# Patient Record
Sex: FEMALE | Race: NATIVE HAWAIIAN OR OTHER PACIFIC ISLANDER | NOT HISPANIC OR LATINO | Employment: UNEMPLOYED | URBAN - METROPOLITAN AREA
[De-identification: names, ages, dates, MRNs, and addresses within clinical notes are randomized per-mention and may not be internally consistent; named-entity substitution may affect disease eponyms.]

---

## 2017-05-21 ENCOUNTER — APPOINTMENT (OUTPATIENT)
Dept: RADIOLOGY | Facility: MEDICAL CENTER | Age: 22
End: 2017-05-21
Attending: OBSTETRICS & GYNECOLOGY
Payer: MEDICAID

## 2017-05-21 ENCOUNTER — HOSPITAL ENCOUNTER (INPATIENT)
Facility: MEDICAL CENTER | Age: 22
LOS: 2 days | End: 2017-05-23
Attending: OBSTETRICS & GYNECOLOGY | Admitting: OBSTETRICS & GYNECOLOGY
Payer: MEDICAID

## 2017-05-21 LAB
BASOPHILS # BLD AUTO: 0.3 % (ref 0–1.8)
BASOPHILS # BLD: 0.03 K/UL (ref 0–0.12)
EOSINOPHIL # BLD AUTO: 0.06 K/UL (ref 0–0.51)
EOSINOPHIL NFR BLD: 0.6 % (ref 0–6.9)
ERYTHROCYTE [DISTWIDTH] IN BLOOD BY AUTOMATED COUNT: 43.8 FL (ref 35.9–50)
HCT VFR BLD AUTO: 38.5 % (ref 37–47)
HGB BLD-MCNC: 12.3 G/DL (ref 12–16)
HOLDING TUBE BB 8507: NORMAL
IMM GRANULOCYTES # BLD AUTO: 0.07 K/UL (ref 0–0.11)
IMM GRANULOCYTES NFR BLD AUTO: 0.7 % (ref 0–0.9)
LYMPHOCYTES # BLD AUTO: 2.19 K/UL (ref 1–4.8)
LYMPHOCYTES NFR BLD: 21.9 % (ref 22–41)
MCH RBC QN AUTO: 25.8 PG (ref 27–33)
MCHC RBC AUTO-ENTMCNC: 31.9 G/DL (ref 33.6–35)
MCV RBC AUTO: 80.7 FL (ref 81.4–97.8)
MONOCYTES # BLD AUTO: 0.69 K/UL (ref 0–0.85)
MONOCYTES NFR BLD AUTO: 6.9 % (ref 0–13.4)
NEUTROPHILS # BLD AUTO: 6.94 K/UL (ref 2–7.15)
NEUTROPHILS NFR BLD: 69.6 % (ref 44–72)
NRBC # BLD AUTO: 0 K/UL
NRBC BLD AUTO-RTO: 0 /100 WBC
PLATELET # BLD AUTO: 210 K/UL (ref 164–446)
PMV BLD AUTO: 10.6 FL (ref 9–12.9)
RBC # BLD AUTO: 4.77 M/UL (ref 4.2–5.4)
WBC # BLD AUTO: 10 K/UL (ref 4.8–10.8)

## 2017-05-21 PROCEDURE — 85025 COMPLETE CBC W/AUTO DIFF WBC: CPT

## 2017-05-21 PROCEDURE — 770002 HCHG ROOM/CARE - OB PRIVATE (112)

## 2017-05-21 PROCEDURE — 700105 HCHG RX REV CODE 258: Performed by: OBSTETRICS & GYNECOLOGY

## 2017-05-21 PROCEDURE — 76815 OB US LIMITED FETUS(S): CPT

## 2017-05-21 PROCEDURE — 700111 HCHG RX REV CODE 636 W/ 250 OVERRIDE (IP): Performed by: OBSTETRICS & GYNECOLOGY

## 2017-05-21 PROCEDURE — 36415 COLL VENOUS BLD VENIPUNCTURE: CPT

## 2017-05-21 RX ORDER — MISOPROSTOL 200 UG/1
800 TABLET ORAL
Status: COMPLETED | OUTPATIENT
Start: 2017-05-21 | End: 2017-05-22

## 2017-05-21 RX ORDER — METHYLERGONOVINE MALEATE 0.2 MG/ML
0.2 INJECTION INTRAVENOUS
Status: DISCONTINUED | OUTPATIENT
Start: 2017-05-21 | End: 2017-05-22 | Stop reason: HOSPADM

## 2017-05-21 RX ORDER — PENICILLIN G POTASSIUM 5000000 [IU]/1
5 INJECTION, POWDER, FOR SOLUTION INTRAMUSCULAR; INTRAVENOUS ONCE
Status: COMPLETED | OUTPATIENT
Start: 2017-05-21 | End: 2017-05-21

## 2017-05-21 RX ORDER — SODIUM CHLORIDE, SODIUM LACTATE, POTASSIUM CHLORIDE, CALCIUM CHLORIDE 600; 310; 30; 20 MG/100ML; MG/100ML; MG/100ML; MG/100ML
INJECTION, SOLUTION INTRAVENOUS CONTINUOUS
Status: DISPENSED | OUTPATIENT
Start: 2017-05-21 | End: 2017-05-22

## 2017-05-21 RX ORDER — ALUMINA, MAGNESIA, AND SIMETHICONE 2400; 2400; 240 MG/30ML; MG/30ML; MG/30ML
30 SUSPENSION ORAL EVERY 6 HOURS PRN
Status: DISCONTINUED | OUTPATIENT
Start: 2017-05-21 | End: 2017-05-22 | Stop reason: HOSPADM

## 2017-05-21 RX ADMIN — PENICILLIN G POTASSIUM 5 MILLION UNITS: 5000000 POWDER, FOR SOLUTION INTRAMUSCULAR; INTRAPLEURAL; INTRATHECAL; INTRAVENOUS at 20:35

## 2017-05-21 RX ADMIN — SODIUM CHLORIDE, POTASSIUM CHLORIDE, SODIUM LACTATE AND CALCIUM CHLORIDE: 600; 310; 30; 20 INJECTION, SOLUTION INTRAVENOUS at 20:25

## 2017-05-21 RX ADMIN — Medication 2 MILLI-UNITS/MIN: at 20:38

## 2017-05-21 ASSESSMENT — PATIENT HEALTH QUESTIONNAIRE - PHQ9
SUM OF ALL RESPONSES TO PHQ9 QUESTIONS 1 AND 2: 0
SUM OF ALL RESPONSES TO PHQ QUESTIONS 1-9: 0
2. FEELING DOWN, DEPRESSED, IRRITABLE, OR HOPELESS: NOT AT ALL
1. LITTLE INTEREST OR PLEASURE IN DOING THINGS: NOT AT ALL

## 2017-05-21 ASSESSMENT — COPD QUESTIONNAIRES
DURING THE PAST 4 WEEKS HOW MUCH DID YOU FEEL SHORT OF BREATH: NONE/LITTLE OF THE TIME
DO YOU EVER COUGH UP ANY MUCUS OR PHLEGM?: NO/ONLY WITH OCCASIONAL COLDS OR INFECTIONS
HAVE YOU SMOKED AT LEAST 100 CIGARETTES IN YOUR ENTIRE LIFE: NO/DON'T KNOW
COPD SCREENING SCORE: 0

## 2017-05-21 ASSESSMENT — LIFESTYLE VARIABLES
EVER_SMOKED: NEVER
ALCOHOL_USE: NO

## 2017-05-21 NOTE — IP AVS SNAPSHOT
5/23/2017    Aleja Matute  [address] [place] [address]    Dear Aleja:    [facility] wants to ensure your discharge home is safe and you or your loved ones have had all of your questions answered regarding your care after you leave the hospital.    Below is a list of resources and contact information should you have any questions regarding your hospital stay, follow-up instructions, or active medical symptoms.    Questions or Concerns Regarding… Contact   Medical Questions Related to Your Discharge  (7 days a week, 8am-5pm) Contact a Nurse Care Coordinator   [phone]   Medical Questions Not Related to Your Discharge  (24 hours a day / 7 days a week)  Contact the Nurse Health Line   [phone]    Medications or Discharge Instructions Refer to your discharge packet   or contact your [facility] Primary Care Provider   [phone]   Follow-up Appointment(s) Schedule your appointment via [employer]   or contact Scheduling [phone]   Billing Review your statement via [employer]  or contact Billing [phone]   Medical Records Review your records via [employer]   or contact Medical Records [phone]     You may receive a telephone call within two days of discharge. This call is to make certain you understand your discharge instructions and have the opportunity to have any questions answered. You can also easily access your medical information, test results and upcoming appointments via the [employer] free online health management tool. You can learn more and sign up at [employer]. For assistance setting up your [employer] account, please call [phone].    Once again, we want to ensure your discharge home is safe and that you have a clear understanding of any next steps in your care. If you have any questions or concerns, please do not hesitate to contact us, we are here for you. Thank you for choosing [facility] for your healthcare needs.    Sincerely,    Your [facility] Healthcare Team

## 2017-05-21 NOTE — IP AVS SNAPSHOT
Home Care Instructions                                                                                                                Aleja Matute   MRN: [id number]    Department:  POST PARTUM 31   2017           Follow-up Information     1. Follow up with Natalie Han M.D. In 5 weeks.    Specialty:  OB/Gyn    Contact information    [address] [place] [address]  [place] [address]  [phone]         I assume responsibility for securing a follow-up  Screening blood test on my baby within the specified date range.    -                  Discharge Instructions       POSTPARTUM DISCHARGE INSTRUCTIONS FOR MOM    YOB: 1995   Age: 22 y.o.               Admit Date: 2017     Discharge Date: 2017  Attending Doctor:  Chau Sarabia M.D.                  Allergies:  Review of patient's allergies indicates no known allergies.    Discharged to home by car. Discharged via wheelchair, hospital escort: Yes.  Special equipment needed: Not Applicable  Belongings with: Personal  Be sure to schedule a follow-up appointment with your primary care doctor or any specialists as instructed.     Discharge Plan:   Influenza Vaccine Indication: Indicated: Not available from distributor/    REASONS TO CALL YOUR OBSTETRICIAN:  1.   Persistent fever or shaking chills (Temperature higher than 100.4)  2.   Heavy bleeding (soaking more than 1 pad per hour); Passing clots  3.   Foul odor from vagina  4.   Mastitis (Breast infection; breast pain, chills, fever, redness)  5.   Urinary pain, burning or frequency  6.   Episiotomy infection  7.   Abdominal incision infection  8.   Severe depression longer than 24 hours    HAND WASHING  · Prior to handling the baby.  · Before breastfeeding or bottle feeding baby.  · After using the bathroom or changing the baby's diaper.    WOUND CARE  Ask your physician for additional care instructions.  In general:    ·  Incision:      · Keep clean and dry.    · Do NOT lift  "anything heavier than your baby for up to 6 weeks.    · There should not be any opening or pus.      VAGINAL CARE  · Nothing inside vagina for 6 weeks: no sexual intercourse, tampons or douching.  · Bleeding may continue for 2-4 weeks.  Amount may vary.    · Call your physician for heavy bleeding which means soaking more than 1 pad per hour    BIRTH CONTROL  · It is possible to become pregnant at any time after delivery and while breastfeeding.  · Plan to discuss a method of birth control with your physician at your follow up visit. visit.    DIET AND ELIMINATION  · Eating more fiber (bran cereal, fruits, and vegetables) and drinking plenty of fluids will help to avoid constipation.  · Urinary frequency after childbirth is normal.    POSTPARTUM BLUES  During the first few days after birth, you may experience a sense of the \"blues\" which may include impatience, irritability or even crying.  These feeling come and go quickly.  However, as many as 1 in 10 women experience emotional symptoms known as postpartum depression.    Postpartum depression:  May start as early as the second or third day after delivery or take several weeks or months to develop.  Symptoms of \"blues\" are present, but are more intense:  Crying spells; loss of appetite; feelings of hopelessness or loss of control; fear of touching the baby; over concern or no concern at all about the baby; little or no concern about your own appearance/caring for yourself; and/or inability to sleep or excessive sleeping.  Contact your physician if you are experiencing any of these symptoms.    Crisis Hotline:  · [place] Crisis Hotline:  9-528-MWGPCRA  Or 1-332.757.2055  · [place] Crisis Hotline:  1-130.157.2337  Or 955-838-7991    PREVENTING SHAKEN BABY:  If you are angry or stressed, PUT THE BABY IN THE CRIB, step away, take some deep breaths, and wait until you are calm to care for the baby.  DO NOT SHAKE THE BABY.  You are not alone, call a supporter for " "help.    · Crisis Call Center 24/7 crisis line 481-394-7850 or 1-253.281.1385  · You can also text them, text \"ANSWER\" to 112051    QUIT SMOKING/TOBACCO USE:  I understand the use of any tobacco products increases my chance of suffering from future heart disease and could cause other illnesses which may shorten my life. Quitting the use of tobacco products is the single most important thing I can do to improve my health. For further information on smoking / tobacco cessation call a Toll Free Quit Line at 1-633.898.4911 (*National Cancer [employer]) or 1-867.288.5533 (American Lung Association) or you can access the web based program at www.lungusa.org.    · [place] Tobacco Users Help Line:  (917) 205-7722       Toll Free: 1-977.145.4312  · Quit Tobacco Program [facility] (225)207-9551    DEPRESSION / SUICIDE RISK:  As you are discharged from this [facility], it is important to learn how to keep safe from harming yourself.    Recognize the warning signs:  · Abrupt changes in personality, positive or negative- including increase in energy   · Giving away possessions  · Change in eating patterns- significant weight changes-  positive or negative  · Change in sleeping patterns- unable to sleep or sleeping all the time   · Unwillingness or inability to communicate  · Depression  · Unusual sadness, discouragement and loneliness  · Talk of wanting to die  · Neglect of personal appearance   · Rebelliousness- reckless behavior  · Withdrawal from people/activities they love  · Confusion- inability to concentrate     If you or a loved one observes any of these behaviors or has concerns about self-harm, here's what you can do:  · Talk about it- your feelings and reasons for harming yourself  · Remove any means that you might use to hurt yourself (examples: pills, rope, extension cords, firearm)  · Get professional help from the community (Mental Health, Substance Abuse, psychological " counseling)  · Do not be alone:Call your Safe Contact- someone whom you trust who will be there for you.  · Call your local CRISIS HOTLINE 828-9050 or 184-779-8487  · Call your local Children's Mobile Crisis Response Team [place] (450) 621-0649 or www.LedgerX  · Call the toll free National Suicide Prevention Hotlines   · National Suicide Prevention Lifeline 613-991-CJXG (6505)  · National Hope Line Network 800-SUICIDE (603-0317)    DISCHARGE SURVEY:  Thank you for choosing [facility].  We hope we provided you with very good care.  You may be receiving a survey in the mail.  Please fill it out.  Your opinion is valuable to us.    ADDITIONAL EDUCATIONAL MATERIALS GIVEN TO PATIENT:        My signature on this form indicates that:  1.  I have reviewed and understand the above information  2.  My questions regarding this information have been answered to my satisfaction.  3.  I have formulated a plan with my discharge nurse to obtain my prescribed medication for home.         Discharge Medication Instructions:    Below are the medications your physician expects you to take upon discharge:    Review all your home medications and newly ordered medications with your doctor and/or pharmacist. Follow medication instructions as directed by your doctor and/or pharmacist.    Please keep your medication list with you and share with your physician.               Medication List      START taking these medications        Instructions    Morning Afternoon Evening Bedtime     MG Caps        Take 100 mg by mouth 2 times a day as needed for Constipation.   Dose:  100 mg                        ibuprofen 600 MG Tabs   Last time this was given:  600 mg on 5/23/2017  6:14 AM   Commonly known as:  MOTRIN        Take 1 Tab by mouth every 6 hours as needed (For cramping after delivery; do not give if patient is receiving ketorolac (Toradol)).   Dose:  600 mg                          CONTINUE taking these medications         Instructions    Morning Afternoon Evening Bedtime    PNV PO        Take 1 Tab by mouth.   Dose:  1 Tab                             Where to Get Your Medications      Information about where to get these medications is not yet available     ! Ask your nurse or doctor about these medications    -  MG Caps  - ibuprofen 600 MG Tabs            Crisis Hotline:     [place] Crisis Hotline:  4-784-QZDZUJQ or 1-138.815.5967    [place] Crisis Hotline:    1-519.193.8496 or 773-016-9280        Disclaimer           _____________________________________                     __________       ________       Patient/Mother Signature or Legal                          Date                   Time

## 2017-05-21 NOTE — IP AVS SNAPSHOT
[employer] Access Code: [email or web address]  Expires: 6/22/2017  3:29 PM    [employer]  A secure, online tool to manage your health information     [employer]® is a secure, online tool that connects you to your personalized health information from the privacy of your home -- day or night - making it very easy for you to manage your healthcare. Once the activation process is completed, you can even access your medical information using the [employer] ney, which is available for free in the Apple Ney store or Google Play store.     [employer] provides the following levels of access (as shown below):   My Chart Features   [facility] Primary Care Doctor [facility]  Specialists [facility]  Urgent  Care Non-[facility]  Primary Care  Doctor   Email your healthcare team securely and privately 24/7 X X X X   Manage appointments: schedule your next appointment; view details of past/upcoming appointments X      Request prescription refills. X      View recent personal medical records, including lab and immunizations X X X X   View health record, including health history, allergies, medications X X X X   Read reports about your outpatient visits, procedures, consult and ER notes X X X X   See your discharge summary, which is a recap of your hospital and/or ER visit that includes your diagnosis, lab results, and care plan. X X       How to register for [employer]:  1. Go to  [email or web address].  2. Click on the Sign Up Now box, which takes you to the New Member Sign Up page. You will need to provide the following information:  a. Enter your [employer] Access Code exactly as it appears at the top of this page. (You will not need to use this code after you’ve completed the sign-up process. If you do not sign up before the expiration date, you must request a new code.)   b. Enter your date of birth.   c. Enter your home email address.   d. Click Submit, and follow the next screen’s instructions.  3. Create a [employer] ID. This will be your [employer]  login ID and cannot be changed, so think of one that is secure and easy to remember.  4. Create a [employer] password. You can change your password at any time.  5. Enter your Password Reset Question and Answer. This can be used at a later time if you forget your password.   6. Enter your e-mail address. This allows you to receive e-mail notifications when new information is available in [employer].  7. Click Sign Up. You can now view your health information.    For assistance activating your [employer] account, call [phone]

## 2017-05-22 LAB
ERYTHROCYTE [DISTWIDTH] IN BLOOD BY AUTOMATED COUNT: 43.6 FL (ref 35.9–50)
HCT VFR BLD AUTO: 38.4 % (ref 37–47)
HGB BLD-MCNC: 12.4 G/DL (ref 12–16)
MCH RBC QN AUTO: 26 PG (ref 27–33)
MCHC RBC AUTO-ENTMCNC: 32.3 G/DL (ref 33.6–35)
MCV RBC AUTO: 80.5 FL (ref 81.4–97.8)
PLATELET # BLD AUTO: 195 K/UL (ref 164–446)
PMV BLD AUTO: 10.6 FL (ref 9–12.9)
RBC # BLD AUTO: 4.77 M/UL (ref 4.2–5.4)
WBC # BLD AUTO: 12.7 K/UL (ref 4.8–10.8)

## 2017-05-22 PROCEDURE — 85027 COMPLETE CBC AUTOMATED: CPT

## 2017-05-22 PROCEDURE — 36415 COLL VENOUS BLD VENIPUNCTURE: CPT

## 2017-05-22 PROCEDURE — A9270 NON-COVERED ITEM OR SERVICE: HCPCS | Performed by: OBSTETRICS & GYNECOLOGY

## 2017-05-22 PROCEDURE — 59409 OBSTETRICAL CARE: CPT

## 2017-05-22 PROCEDURE — 770002 HCHG ROOM/CARE - OB PRIVATE (112)

## 2017-05-22 PROCEDURE — 700111 HCHG RX REV CODE 636 W/ 250 OVERRIDE (IP): Performed by: OBSTETRICS & GYNECOLOGY

## 2017-05-22 PROCEDURE — 700105 HCHG RX REV CODE 258: Performed by: OBSTETRICS & GYNECOLOGY

## 2017-05-22 PROCEDURE — 4A1H74Z MONITORING OF PRODUCTS OF CONCEPTION, CARDIAC ELECTRICAL ACTIVITY, VIA NATURAL OR ARTIFICIAL OPENING: ICD-10-PCS | Performed by: OBSTETRICS & GYNECOLOGY

## 2017-05-22 PROCEDURE — 700111 HCHG RX REV CODE 636 W/ 250 OVERRIDE (IP)

## 2017-05-22 PROCEDURE — 10907ZC DRAINAGE OF AMNIOTIC FLUID, THERAPEUTIC FROM PRODUCTS OF CONCEPTION, VIA NATURAL OR ARTIFICIAL OPENING: ICD-10-PCS | Performed by: OBSTETRICS & GYNECOLOGY

## 2017-05-22 PROCEDURE — 700102 HCHG RX REV CODE 250 W/ 637 OVERRIDE(OP): Performed by: OBSTETRICS & GYNECOLOGY

## 2017-05-22 PROCEDURE — 10H07YZ INSERTION OF OTHER DEVICE INTO PRODUCTS OF CONCEPTION, VIA NATURAL OR ARTIFICIAL OPENING: ICD-10-PCS | Performed by: OBSTETRICS & GYNECOLOGY

## 2017-05-22 PROCEDURE — 304965 HCHG RECOVERY SERVICES

## 2017-05-22 RX ORDER — METHYLERGONOVINE MALEATE 0.2 MG/ML
0.2 INJECTION INTRAVENOUS
Status: DISCONTINUED | OUTPATIENT
Start: 2017-05-22 | End: 2017-05-23 | Stop reason: HOSPADM

## 2017-05-22 RX ORDER — OXYCODONE HYDROCHLORIDE AND ACETAMINOPHEN 5; 325 MG/1; MG/1
1 TABLET ORAL EVERY 4 HOURS PRN
Status: DISCONTINUED | OUTPATIENT
Start: 2017-05-22 | End: 2017-05-23 | Stop reason: HOSPADM

## 2017-05-22 RX ORDER — SODIUM CHLORIDE, SODIUM LACTATE, POTASSIUM CHLORIDE, CALCIUM CHLORIDE 600; 310; 30; 20 MG/100ML; MG/100ML; MG/100ML; MG/100ML
INJECTION, SOLUTION INTRAVENOUS PRN
Status: DISCONTINUED | OUTPATIENT
Start: 2017-05-22 | End: 2017-05-23 | Stop reason: HOSPADM

## 2017-05-22 RX ORDER — MISOPROSTOL 200 UG/1
600 TABLET ORAL
Status: DISCONTINUED | OUTPATIENT
Start: 2017-05-22 | End: 2017-05-23 | Stop reason: HOSPADM

## 2017-05-22 RX ORDER — ONDANSETRON 2 MG/ML
4 INJECTION INTRAMUSCULAR; INTRAVENOUS EVERY 6 HOURS PRN
Status: DISCONTINUED | OUTPATIENT
Start: 2017-05-22 | End: 2017-05-23 | Stop reason: HOSPADM

## 2017-05-22 RX ORDER — DOCUSATE SODIUM 100 MG/1
100 CAPSULE, LIQUID FILLED ORAL 2 TIMES DAILY PRN
Status: DISCONTINUED | OUTPATIENT
Start: 2017-05-22 | End: 2017-05-23 | Stop reason: HOSPADM

## 2017-05-22 RX ORDER — OXYCODONE AND ACETAMINOPHEN 7.5; 325 MG/1; MG/1
1 TABLET ORAL EVERY 4 HOURS PRN
Status: CANCELLED | OUTPATIENT
Start: 2017-05-22

## 2017-05-22 RX ORDER — IBUPROFEN 600 MG/1
600 TABLET ORAL EVERY 6 HOURS PRN
Status: DISCONTINUED | OUTPATIENT
Start: 2017-05-22 | End: 2017-05-23 | Stop reason: HOSPADM

## 2017-05-22 RX ORDER — METHYLERGONOVINE MALEATE 0.2 MG/ML
INJECTION INTRAVENOUS
Status: COMPLETED
Start: 2017-05-22 | End: 2017-05-22

## 2017-05-22 RX ORDER — ACETAMINOPHEN 325 MG/1
650 TABLET ORAL EVERY 4 HOURS PRN
Status: CANCELLED | OUTPATIENT
Start: 2017-05-22

## 2017-05-22 RX ORDER — VITAMIN A ACETATE, BETA CAROTENE, ASCORBIC ACID, CHOLECALCIFEROL, .ALPHA.-TOCOPHEROL ACETATE, DL-, THIAMINE MONONITRATE, RIBOFLAVIN, NIACINAMIDE, PYRIDOXINE HYDROCHLORIDE, FOLIC ACID, CYANOCOBALAMIN, CALCIUM CARBONATE, FERROUS FUMARATE, ZINC OXIDE, CUPRIC OXIDE 3080; 12; 120; 400; 1; 1.84; 3; 20; 22; 920; 25; 200; 27; 10; 2 [IU]/1; UG/1; MG/1; [IU]/1; MG/1; MG/1; MG/1; MG/1; MG/1; [IU]/1; MG/1; MG/1; MG/1; MG/1; MG/1
1 TABLET, FILM COATED ORAL EVERY MORNING
Status: DISCONTINUED | OUTPATIENT
Start: 2017-05-22 | End: 2017-05-23 | Stop reason: HOSPADM

## 2017-05-22 RX ORDER — ONDANSETRON 4 MG/1
4 TABLET, ORALLY DISINTEGRATING ORAL EVERY 6 HOURS PRN
Status: DISCONTINUED | OUTPATIENT
Start: 2017-05-22 | End: 2017-05-23 | Stop reason: HOSPADM

## 2017-05-22 RX ORDER — OXYCODONE HYDROCHLORIDE AND ACETAMINOPHEN 5; 325 MG/1; MG/1
2 TABLET ORAL EVERY 4 HOURS PRN
Status: DISCONTINUED | OUTPATIENT
Start: 2017-05-22 | End: 2017-05-23 | Stop reason: HOSPADM

## 2017-05-22 RX ORDER — ACETAMINOPHEN 325 MG/1
325 TABLET ORAL EVERY 4 HOURS PRN
Status: DISCONTINUED | OUTPATIENT
Start: 2017-05-22 | End: 2017-05-23 | Stop reason: HOSPADM

## 2017-05-22 RX ORDER — IBUPROFEN 600 MG/1
600 TABLET ORAL EVERY 6 HOURS PRN
Status: CANCELLED | OUTPATIENT
Start: 2017-05-22

## 2017-05-22 RX ADMIN — OXYCODONE HYDROCHLORIDE AND ACETAMINOPHEN 1 TABLET: 5; 325 TABLET ORAL at 10:10

## 2017-05-22 RX ADMIN — METHYLERGONOVINE MALEATE 0.2 MG: 0.2 INJECTION, SOLUTION INTRAMUSCULAR; INTRAVENOUS at 03:15

## 2017-05-22 RX ADMIN — Medication 125 ML/HR: at 03:41

## 2017-05-22 RX ADMIN — OXYCODONE HYDROCHLORIDE AND ACETAMINOPHEN 1 TABLET: 5; 325 TABLET ORAL at 05:35

## 2017-05-22 RX ADMIN — IBUPROFEN 600 MG: 600 TABLET, FILM COATED ORAL at 05:35

## 2017-05-22 RX ADMIN — IBUPROFEN 600 MG: 600 TABLET, FILM COATED ORAL at 23:37

## 2017-05-22 RX ADMIN — IBUPROFEN 600 MG: 600 TABLET, FILM COATED ORAL at 11:37

## 2017-05-22 RX ADMIN — OXYCODONE HYDROCHLORIDE AND ACETAMINOPHEN 1 TABLET: 5; 325 TABLET ORAL at 23:37

## 2017-05-22 RX ADMIN — MISOPROSTOL 800 MCG: 200 TABLET ORAL at 03:23

## 2017-05-22 RX ADMIN — OXYCODONE HYDROCHLORIDE AND ACETAMINOPHEN 1 TABLET: 5; 325 TABLET ORAL at 19:15

## 2017-05-22 RX ADMIN — OXYCODONE HYDROCHLORIDE AND ACETAMINOPHEN 1 TABLET: 5; 325 TABLET ORAL at 15:10

## 2017-05-22 RX ADMIN — Medication 1 TABLET: at 09:43

## 2017-05-22 RX ADMIN — IBUPROFEN 600 MG: 600 TABLET, FILM COATED ORAL at 17:24

## 2017-05-22 RX ADMIN — SODIUM CHLORIDE 2.5 MILLION UNITS: 9 INJECTION, SOLUTION INTRAVENOUS at 00:33

## 2017-05-22 ASSESSMENT — PAIN SCALES - GENERAL
PAINLEVEL_OUTOF10: 3
PAINLEVEL_OUTOF10: 4
PAINLEVEL_OUTOF10: 3
PAINLEVEL_OUTOF10: 2
PAINLEVEL_OUTOF10: 4
PAINLEVEL_OUTOF10: 5
PAINLEVEL_OUTOF10: 5
PAINLEVEL_OUTOF10: 0
PAINLEVEL_OUTOF10: 6
PAINLEVEL_OUTOF10: 2

## 2017-05-22 NOTE — PROGRESS NOTES
- pt presents to unit with c/o of UC. Denies LOV, some old blood d/c with mucous, states +FM. Pt received prenatal care from [place], moved to [place] at 38 weeks to live with mom temporarily while SO relocated to [place] for a new job. Pt did not see there was a point to seek prenatal care until delivery. Did not look for a ped either. Pt states UC started at about 1300 and became more closer together at 1600. Pt does have a copy of prenatal records from provider.   - Pt is a , EDC 17, GA 41.0. GBBS +. Denies complications during pregnancy. Spoke with Dr. Sarabia regarding pt, order to perform SVE.   -Report to NOHEMI Urias RN.

## 2017-05-22 NOTE — CARE PLAN
Problem: Pain  Goal: Patient will have relaxed facial expressions and be able to rest between uterine contractions  Outcome: MET Date Met:  05/22/17  Pt desired natural childbirth with no medication. Labor support provided by family and RN. Pt tolerated well.

## 2017-05-22 NOTE — PROGRESS NOTES
1900 report received from triage RN, SARI 4/50/-2, Dr Sarabia at bedside with US. Orders to admit received  1930 report to THOMAS Cano RN  1938 Dr Sarabia called requesting an US for EFW

## 2017-05-22 NOTE — PROGRESS NOTES
0100- Received report from THOMAS Cano RN. Assumed care of pt. Pt laboring, tolerating well. Encouraged to call with needs.  0257- SVE complete. Dr. Sarabia called for delivery.  0306-  of a viable female by Dr. Sarabia. Apgars 8/9.  0500- Pt up to BR. Voided, nery care done. Assisted to w/c, transferred to PP. Report to ELLA Trammell RN

## 2017-05-22 NOTE — DELIVERY NOTE
DATE OF SERVICE:  2017    DELIVERY NOTE:  This patient is a 22-year-old [place] female,  3, para   2, currently at 41-0/7 weeks, walk-in patient, admitted for induction of labor   after evaluation.  The patient is 41-0/7 weeks with excellent dates, has an   estimated fetal weight on ultrasound of 9 pounds and is now post-dates.  The   patient was started on Pitocin augmentation of labor.  She was initially 4 cm   dilated, 50% effaced.  She dilated to 7 cm, complete effacement, and 0   station, had amniotomy performed with intrauterine pressure catheter placed.    After receiving 2 doses of penicillin G for group B strep positive   rectovaginal culture, clear amniotic fluid noted, dilated to complete and   complete with normal fetal heart rate tracing and delivered via normal   spontaneous vaginal delivery, complicated by nuchal cord x1.  Apgar scores 8   and 9.  Cord gases were drawn by myself.  Arterial pH 7.33 with a base excess   of 0, venous pH 7.40 with a base excess of 0.   mL.  Complications   none.  Category 1 fetal heart rate tracing throughout.       ____________________________________     MD IFEANYI AMADOR / AMANDA    DD:  2017 03:33:22  DT:  2017 03:57:59    D#:  [id number]  Job#:  [id number]

## 2017-05-22 NOTE — CARE PLAN
Problem: Pain  Goal: Alleviation of Pain or a reduction in pain to the patient’s comfort goal  Outcome: PROGRESSING AS EXPECTED  Discussed with pt options for pain management. Pt does not want an epidural. She has not had or needed one with her other babies. Discussed the benefits of epidural vs IV pain meds if there was a medical emergency, such as shoulder dystocia. She would be open to having some IV pain meds as she had some with prior deliveries. Pt knows to ask RN for meds when she is ready.     Problem: Risk for Infection, Impaired Wound Healing  Goal: Remain free from signs and symptoms of infection  Outcome: PROGRESSING AS EXPECTED  Discussed with patient the need to get two doses of antibiotics on board before AROM to reduce the chance of infection for baby.

## 2017-05-22 NOTE — PROGRESS NOTES
1930 Report rcvd from MECHELLE Thomson, at bedside. POC discussed, pt care assumed. Pt found to be lying in bed, in no apparent distress. Pt ambulated from triage to S220.   2025 IV placed. Lab here to draw labs.   2035 Beside US for EFW (~9#).   0100 Report to MECHELLE Preston, at bedside.

## 2017-05-22 NOTE — PROGRESS NOTES
Patient presents to unit from L&D via wheelchair with infant in arms and family member at side.  Infant placed to bassinet and patient transfers self to bed without issue.  IV fluids placed to pump per orders and bedside report received from off going RN.  Plan of care discussed, questions answered and understanding verbalized. Oriented patient to room, call light, care board, emergency light and delfina light.  Encouraged her to call for assistance or with questions, comments or concerns.  Patient requesting pain medications at this time.  CHRISTIE is in [place] with a new job.  Not .  Would like him on the birth certificate so will relay information to birth certificate clerks.

## 2017-05-22 NOTE — H&P
History and Physical;      Aleja Matute is a 22 y.o. year old female  at 41-0/7 weeks by 23 week ultrasound-walk-in patient-had previous OB care in [place] has had OB care from 23 weeks to 36 weeks. Presents for labor check-not currently in labor. Has medical records with her-ultrasound results confirm dates, prenatal labs normal, one hour Glucola 139, PIH labs normal, ultrasound shows posterior fundal placenta. The patient has a history of macrosomia with previous 9 pound baby. No history of shoulder dystocia. GBS culture positive at 36 weeks.    Subjective:   negative  For CTXS.   negative Feels pain   negative for LOF  negative for vaginal bleeding.   positive for fetal movement    ROS: A comprehensive review of systems was negative.    No past medical history on file.  No past surgical history on file.  OB History    Para Term  AB SAB TAB Ectopic Multiple Living   2 1 1       1      # Outcome Date GA Lbr [place]/2nd Weight Sex Delivery Anes PTL Lv   2             1 Term                 Social History     Social History   • Marital Status: Single     Spouse Name: N/A   • Number of Children: N/A   • Years of Education: N/A     Occupational History   • Not on file.     Social History Main Topics   • Smoking status: Never Smoker    • Smokeless tobacco: Never Used   • Alcohol Use: Yes      Comment: first time today   • Drug Use: No   • Sexual Activity: Not on file     Other Topics Concern   • Not on file     Social History Narrative     Allergies: Review of patient's allergies indicates no known allergies.    Current facility-administered medications:   •  LR infusion, , Intravenous, Continuous, Chau Sarabia M.D.  •  mag hydrox-al hydrox-simeth (MAALOX PLUS ES or MYLANTA DS) suspension 30 mL, 30 mL, Oral, Q6HRS PRN, Chau Sarabia M.D.  •  penicillin G potassium injection 5 Million Units, 5 Million Units, Intravenous, Once **FOLLOWED BY** penicillin G potassium 2.5 Million Units in   "mL IVPB, 2.5 Million Units, Intravenous, Q4HRS, Chau Sarabia M.D.  •  oxytocin (PITOCIN) infusion (for induction), 0.5-20 bobo-units/min, Intravenous, Continuous, Chau Sarabia M.D.  •  misoprostol (CYTOTEC) tablet 800 mcg, 800 mcg, Rectal, Once PRN, Chau Sarabia M.D.  •  methylergonovine (METHERGINE) injection 0.2 mg, 0.2 mg, Intramuscular, Once PRN, Chau Sarabia M.D.    Prenatal care with [facility] with the following problems:  Patient Active Problem List    Diagnosis Date Noted   • Labor and delivery, indication for care 05/21/2017   • Labor and delivery indication for care or intervention 01/09/2016         Objective:      Blood pressure 118/69, pulse 86, temperature 36.5 °C (97.7 °F), temperature source Temporal, resp. rate 18, height 1.6 m (5' 3\"), weight 114.306 kg (252 lb).    General:   no acute distress   Skin:   normal   HEENT:  PERRLA   Lungs:   CTA bilateral   Heart:   brisk carotid upstroke without bruits, peripheral pulses very brisk, chest is clear without rales or wheezing, no pedal edema, no JVD, no hepatosplenomegaly   Abdomen:   gravid, NT   EFW:  4200 g   Pelvis:  Vulva and vagina appear normal. Bimanual exam reveals normal uterus and adnexa., proven to 4100 g   FHTs: 145 BPM good accels no decels good variability   Contractions: 0 contractions in 10 min soft to palpation   Uterine Size: S=D   Presentations: Cephalic per RN   Cervix: Per RN    Dilation: 4 cm    Effacement: 50%    Station:  -1    Consistency: Soft    Position: Middle     Complete OB US  See labs    Lab Review  Lab:   Blood type: O     No results found for this or any previous visit (from the past 5880 hour(s)).     Assessment:   1.  IUP at Unknown  2.  Labor status: Not in labor.  3.  Cat 1 FHTs  4.  Obstetrical history significant for   Patient Active Problem List    Diagnosis Date Noted   • Labor and delivery, indication for care 05/21/2017   • Labor and delivery indication for care or intervention 01/09/2016   .    "   Plan:     Admit to L&D  GBS positive  Routine labs  Pain management prn  Penicillin G for GBS positive rectovaginal culture  We'll obtain an estimated fetal weight  Patient has a clinical estimated fetal weight approximately 4200 g, estimated fetal weight by ultrasound is pending  Patient will be offered primary  section if estimated fetal weight 4500 g or greater. In addition, this patient is at great risk for shoulder dystocia even with estimated fetal weight less than 4500 g-I will discuss the possibility of performing elective primary  section and counseled her regarding the risks of shoulder dystocia and Erb's palsy.  Patient is also at great risk for postpartum hemorrhage.

## 2017-05-22 NOTE — CARE PLAN
Problem: Altered physiologic condition related to immediate post-delivery state and potential for bleeding/hemorrhage  Goal: Patient physiologically stable as evidenced by normal lochia, palpable uterine involution and vital signs within normal limits  Outcome: PROGRESSING AS EXPECTED  Assess fundus and lochia every 4 hours x12 hours followed by every shift and as indicated.  Educate on appropriate amounts of bleeding and when to contact physician

## 2017-05-22 NOTE — CARE PLAN
Problem: Risk for injury  Goal: Patient and fetus will be free of preventable injury/complications  Outcome: MET Date Met:  17  0306-  of a viable female by Dr. Sarabia. Apgars 8/9.

## 2017-05-22 NOTE — CARE PLAN
Problem: Alteration in comfort related to episiotomy, vaginal repair and/or after birth pains  Goal: Patient is able to ambulate, care for self and infant  Outcome: PROGRESSING AS EXPECTED  Assess pain level every 2-4 hours per protocol.  Patient will ask for pain medication as she sees fit

## 2017-05-22 NOTE — PROGRESS NOTES
Cervix- 7 cm/100 %effaced/0 station    AROM- clear fluid,IUPC placed    PCN g x 2 doses    Continue pitocin    EFW 4085 gms on US

## 2017-05-23 VITALS
RESPIRATION RATE: 18 BRPM | BODY MASS INDEX: 44.65 KG/M2 | WEIGHT: 252 LBS | TEMPERATURE: 97.4 F | SYSTOLIC BLOOD PRESSURE: 96 MMHG | HEART RATE: 70 BPM | DIASTOLIC BLOOD PRESSURE: 48 MMHG | HEIGHT: 63 IN | OXYGEN SATURATION: 96 %

## 2017-05-23 PROCEDURE — A9270 NON-COVERED ITEM OR SERVICE: HCPCS | Performed by: OBSTETRICS & GYNECOLOGY

## 2017-05-23 PROCEDURE — 700102 HCHG RX REV CODE 250 W/ 637 OVERRIDE(OP): Performed by: OBSTETRICS & GYNECOLOGY

## 2017-05-23 RX ORDER — IBUPROFEN 600 MG/1
600 TABLET ORAL EVERY 6 HOURS PRN
Qty: 60 TAB | Refills: 0 | Status: ON HOLD | OUTPATIENT
Start: 2017-05-23 | End: 2020-09-12

## 2017-05-23 RX ORDER — PSEUDOEPHEDRINE HCL 30 MG
100 TABLET ORAL 2 TIMES DAILY PRN
Qty: 60 CAP | Refills: 0 | Status: ON HOLD | OUTPATIENT
Start: 2017-05-23 | End: 2020-09-12

## 2017-05-23 RX ADMIN — OXYCODONE HYDROCHLORIDE AND ACETAMINOPHEN 1 TABLET: 5; 325 TABLET ORAL at 14:04

## 2017-05-23 RX ADMIN — OXYCODONE HYDROCHLORIDE AND ACETAMINOPHEN 1 TABLET: 5; 325 TABLET ORAL at 06:14

## 2017-05-23 RX ADMIN — Medication 1 TABLET: at 08:26

## 2017-05-23 RX ADMIN — IBUPROFEN 600 MG: 600 TABLET, FILM COATED ORAL at 06:14

## 2017-05-23 ASSESSMENT — PAIN SCALES - GENERAL
PAINLEVEL_OUTOF10: 2
PAINLEVEL_OUTOF10: 0
PAINLEVEL_OUTOF10: 2
PAINLEVEL_OUTOF10: 5
PAINLEVEL_OUTOF10: 7

## 2017-05-23 NOTE — DISCHARGE SUMMARY
Discharge Summary:      Aleja Matute      Admit Date:   2017  Discharge Date:  2017     Admitting diagnosis:  Pregnancy  Labor and delivery, indication for care  Discharge Diagnosis: Status post vaginal, spontaneous.  Pregnancy Complications: none  Tubal Ligation:  no        History:  History reviewed. No pertinent past medical history.  OB History    Para Term  AB SAB TAB Ectopic Multiple Living   4 2 1 0 1 1    2      # Outcome Date GA Lbr [place]/2nd Weight Sex Delivery Anes PTL Lv   4             3 SAB            2 Para            1 Term                    Review of patient's allergies indicates no known allergies.  Patient Active Problem List    Diagnosis Date Noted   • Labor and delivery, indication for care 2017   • Labor and delivery indication for care or intervention 2016        Hospital Course:   22 y.o. , now para 3, was admitted with the above mentioned diagnosis, underwent Active Labor, vaginal, spontaneous. Patient postpartum course was unremarkable, with progressive advancement in diet , ambulation and toleration of oral analgesia. Patient without complaints today and desires discharge.      Filed Vitals:    17 0531 17 0800 17 1200 17   BP: 118/75 116/85 117/74 109/61   Pulse: 94 60 67 66   Temp: 36.8 °C (98.2 °F) 36.7 °C (98 °F) 36.5 °C (97.7 °F) 36.4 °C (97.5 °F)   TempSrc:       Resp: 19 16 20 18   Height:       Weight:       SpO2: 95% 96% 94% 98%       Current Facility-Administered Medications   Medication Dose   • ondansetron (ZOFRAN ODT) dispertab 4 mg  4 mg    Or   • ondansetron (ZOFRAN) syringe/vial injection 4 mg  4 mg   • oxytocin (PITOCIN) infusion (for postpartum)   mL/hr   • ibuprofen (MOTRIN) tablet 600 mg  600 mg   • acetaminophen (TYLENOL) tablet 325 mg  325 mg   • oxycodone-acetaminophen (PERCOCET) 5-325 MG per tablet 2 Tab  2 Tab   • oxycodone-acetaminophen (PERCOCET) 5-325 MG per tablet 1 Tab  1  Tab   • LR infusion     • PRN oxytocin (PITOCIN) (20 Units/1000 mL) PRN for excessive uterine bleeding - See Admin Instr  125-999 mL/hr   • misoprostol (CYTOTEC) tablet 600 mcg  600 mcg   • methylergonovine (METHERGINE) injection 0.2 mg  0.2 mg   • docusate sodium (COLACE) capsule 100 mg  100 mg   • prenatal plus vitamin (STUARTNATAL 1+1) 27-1 MG tablet 1 Tab  1 Tab       Exam:  Breast Exam: negative  Abdomen: Abdomen soft, non-tender. BS normal. No masses,  No organomegaly  Fundus Non Tender: yes  Incision: none  Perineum: perineum intact  Extremity: extremities, peripheral pulses and reflexes normal, 1+ pedal edema bilaterally, No redness or tenderness in the calves or thighs     Labs:  Recent Labs      05/21/17 2025 05/22/17   1155   WBC  10.0  12.7*   RBC  4.77  4.77   HEMOGLOBIN  12.3  12.4   HEMATOCRIT  38.5  38.4   MCV  80.7*  80.5*   MCH  25.8*  26.0*   MCHC  31.9*  32.3*   RDW  43.8  43.6   PLATELETCT  210  195   MPV  10.6  10.6        Activity:   Discharge to home  Pelvic Rest x 6 weeks    Assessment:  normal postpartum course  Discharge Assessment: No heavy bleeding or foul vaginal discharge      [email or web address] or [facility] in 5 weeks for vaginal ; 1 week for incision check.      Discharge Meds:   Current Outpatient Prescriptions   Medication Sig Dispense Refill   • ibuprofen (MOTRIN) 600 MG Tab Take 1 Tab by mouth every 6 hours as needed (For cramping after delivery; do not give if patient is receiving ketorolac (Toradol)). 60 Tab 0   • docusate sodium 100 MG Cap Take 100 mg by mouth 2 times a day as needed for Constipation. 60 Cap 0       Eden De La Rosa M.D.

## 2017-05-23 NOTE — CARE PLAN
Problem: Alteration in comfort related to episiotomy, vaginal repair and/or after birth pains  Goal: Patient verbalizes acceptable pain level  Outcome: PROGRESSING AS EXPECTED  Reviewed 0-10 pain scale and available pain meds with pt.    Problem: Potential knowledge deficit related to lack of understanding of self and  care  Goal: Patient will demonstrate ability to care for self and infant  Outcome: PROGRESSING AS EXPECTED  Patient demonstrated knowledge in care for herself and infant with feeding, changing diaper, and comforting.

## 2017-05-23 NOTE — PROGRESS NOTES
Received report from Kimberly MIN.  Assumed patient care. Assessment complete. Pt would like to receive her pain meds on schedule. Pt rated her pain at a 4; percocet was given. Pt nipples are inverted. Pt states that baby is breastfeeding fine and that she brought nipple shields if needed. Will continue postpartum care.

## 2017-05-23 NOTE — PROGRESS NOTES
Report received from night RN. Patient resting comfortably in bed. No complaints of pain at this time. Discussed pain control plan with patient. Patient would like pain medication on a PRN basis, she would like to call for pain medication. All questions answered.

## 2017-05-23 NOTE — DISCHARGE INSTRUCTIONS
POSTPARTUM DISCHARGE INSTRUCTIONS FOR MOM    YOB: 1995   Age: 22 y.o.               Admit Date: 2017     Discharge Date: 2017  Attending Doctor:  Chau Sarabia M.D.                  Allergies:  Review of patient's allergies indicates no known allergies.    Discharged to home by car. Discharged via wheelchair, hospital escort: Yes.  Special equipment needed: Not Applicable  Belongings with: Personal  Be sure to schedule a follow-up appointment with your primary care doctor or any specialists as instructed.     Discharge Plan:   Influenza Vaccine Indication: Indicated: Not available from distributor/    REASONS TO CALL YOUR OBSTETRICIAN:  1.   Persistent fever or shaking chills (Temperature higher than 100.4)  2.   Heavy bleeding (soaking more than 1 pad per hour); Passing clots  3.   Foul odor from vagina  4.   Mastitis (Breast infection; breast pain, chills, fever, redness)  5.   Urinary pain, burning or frequency  6.   Episiotomy infection  7.   Abdominal incision infection  8.   Severe depression longer than 24 hours    HAND WASHING  · Prior to handling the baby.  · Before breastfeeding or bottle feeding baby.  · After using the bathroom or changing the baby's diaper.    WOUND CARE  Ask your physician for additional care instructions.  In general:    ·  Incision:      · Keep clean and dry.    · Do NOT lift anything heavier than your baby for up to 6 weeks.    · There should not be any opening or pus.      VAGINAL CARE  · Nothing inside vagina for 6 weeks: no sexual intercourse, tampons or douching.  · Bleeding may continue for 2-4 weeks.  Amount may vary.    · Call your physician for heavy bleeding which means soaking more than 1 pad per hour    BIRTH CONTROL  · It is possible to become pregnant at any time after delivery and while breastfeeding.  · Plan to discuss a method of birth control with your physician at your follow up visit. visit.    DIET AND  "ELIMINATION  · Eating more fiber (bran cereal, fruits, and vegetables) and drinking plenty of fluids will help to avoid constipation.  · Urinary frequency after childbirth is normal.    POSTPARTUM BLUES  During the first few days after birth, you may experience a sense of the \"blues\" which may include impatience, irritability or even crying.  These feeling come and go quickly.  However, as many as 1 in 10 women experience emotional symptoms known as postpartum depression.    Postpartum depression:  May start as early as the second or third day after delivery or take several weeks or months to develop.  Symptoms of \"blues\" are present, but are more intense:  Crying spells; loss of appetite; feelings of hopelessness or loss of control; fear of touching the baby; over concern or no concern at all about the baby; little or no concern about your own appearance/caring for yourself; and/or inability to sleep or excessive sleeping.  Contact your physician if you are experiencing any of these symptoms.    Crisis Hotline:  · [place] Crisis Hotline:  1-738-QBIWJYA  Or 1-858.813.4733  · [place] Crisis Hotline:  1-155.865.9837  Or 245-103-1584    PREVENTING SHAKEN BABY:  If you are angry or stressed, PUT THE BABY IN THE CRIB, step away, take some deep breaths, and wait until you are calm to care for the baby.  DO NOT SHAKE THE BABY.  You are not alone, call a supporter for help.    · Crisis Call Center 24/7 crisis line 798-627-8946 or 1-609.268.8085  · You can also text them, text \"ANSWER\" to 701822    QUIT SMOKING/TOBACCO USE:  I understand the use of any tobacco products increases my chance of suffering from future heart disease and could cause other illnesses which may shorten my life. Quitting the use of tobacco products is the single most important thing I can do to improve my health. For further information on smoking / tobacco cessation call a Toll Free Quit Line at 1-178.878.7985 (*[employer] [place]) or " 1-303.876.1082 (American Lung Association) or you can access the web based program at www.lungusa.org.    · [place] Tobacco Users Help Line:  (412) 853-9757       Toll Free: 1-883.566.2679  · Quit Tobacco Program [facility] (551)156-8251    DEPRESSION / SUICIDE RISK:  As you are discharged from this [facility], it is important to learn how to keep safe from harming yourself.    Recognize the warning signs:  · Abrupt changes in personality, positive or negative- including increase in energy   · Giving away possessions  · Change in eating patterns- significant weight changes-  positive or negative  · Change in sleeping patterns- unable to sleep or sleeping all the time   · Unwillingness or inability to communicate  · Depression  · Unusual sadness, discouragement and loneliness  · Talk of wanting to die  · Neglect of personal appearance   · Rebelliousness- reckless behavior  · Withdrawal from people/activities they love  · Confusion- inability to concentrate     If you or a loved one observes any of these behaviors or has concerns about self-harm, here's what you can do:  · Talk about it- your feelings and reasons for harming yourself  · Remove any means that you might use to hurt yourself (examples: pills, rope, extension cords, firearm)  · Get professional help from the community (Mental Health, Substance Abuse, psychological counseling)  · Do not be alone:Call your Safe Contact- someone whom you trust who will be there for you.  · Call your local CRISIS HOTLINE 164-6824 or 077-085-8926  · Call your local Children's Mobile Crisis Response Team [place] (539) 642-9231 or www.[facility]  · Call the toll free National Suicide Prevention Hotlines   · National Suicide Prevention Lifeline 037-146-CYYI (9444)  · National Hope Line Network 800-SUICIDE (140-6341)    DISCHARGE SURVEY:  Thank you for choosing [facility].  We hope we provided you with very good care.  You may be  receiving a survey in the mail.  Please fill it out.  Your opinion is valuable to us.    ADDITIONAL EDUCATIONAL MATERIALS GIVEN TO PATIENT:        My signature on this form indicates that:  1.  I have reviewed and understand the above information  2.  My questions regarding this information have been answered to my satisfaction.  3.  I have formulated a plan with my discharge nurse to obtain my prescribed medication for home.

## 2017-05-24 NOTE — PROGRESS NOTES
Patient discharged to home with family. All discharge instructions given to and explained to patient. Patient verbalizes understanding. Signed copy of discharge instructions in patients chart. All personal belongings sent home with patient. Patient escorted out by hospital staff via wheel chair.

## 2017-07-20 NOTE — ADDENDUM NOTE
Encounter addended by: Amber Snow R.N. on: 7/20/2017  3:49 PM<BR>     Documentation filed: Inpatient Document Flowsheet

## 2020-04-28 ENCOUNTER — GYNECOLOGY VISIT (OUTPATIENT)
Dept: OBGYN | Facility: CLINIC | Age: 25
End: 2020-04-28
Payer: COMMERCIAL

## 2020-04-28 VITALS — BODY MASS INDEX: 42.34 KG/M2 | SYSTOLIC BLOOD PRESSURE: 120 MMHG | WEIGHT: 239 LBS | DIASTOLIC BLOOD PRESSURE: 74 MMHG

## 2020-04-28 DIAGNOSIS — N93.8 DUB (DYSFUNCTIONAL UTERINE BLEEDING): ICD-10-CM

## 2020-04-28 LAB
INT CON NEG: NEGATIVE
INT CON POS: POSITIVE
POC URINE PREGNANCY TEST: POSITIVE

## 2020-04-28 PROCEDURE — 99214 OFFICE O/P EST MOD 30 MIN: CPT | Mod: 25 | Performed by: OBSTETRICS & GYNECOLOGY

## 2020-04-28 PROCEDURE — 76830 TRANSVAGINAL US NON-OB: CPT | Performed by: OBSTETRICS & GYNECOLOGY

## 2020-04-28 NOTE — PROGRESS NOTES
Chief complaint;  This patient is a 24 y.o. female , Patient's last menstrual period was 2019 (exact date). presents complaining of dysfunctional uterine bleeding.    Review of systems-denies; chest pain, shortness of breath, fever, chills, abdominal pain  Past OB history-  OB History    Para Term  AB Living   5 3 3 0 1 3   SAB TAB Ectopic Molar Multiple Live Births   1         3      # Outcome Date GA Lbr [place]/2nd Weight Sex Delivery Anes PTL Lv   5 Current            4 Term  40w0d  4.132 kg (9 lb 1.8 oz) F Vag-Spont      3 Term  40w0d  4.082 kg (9 lb) M Vag-Spont   TIMOTHY   2 Term  40w0d  3.799 kg (8 lb 6 oz) M Vag-Spont   TIMOTHY   1 SAB              Past surgical history- History reviewed. No pertinent surgical history.  Past medical history- History reviewed. No pertinent past medical history.  Allergies- Patient has no known allergies.  Present medications-   Outpatient Encounter Medications as of 2020   Medication Sig Dispense Refill   • ibuprofen (MOTRIN) 600 MG Tab Take 1 Tab by mouth every 6 hours as needed (For cramping after delivery; do not give if patient is receiving ketorolac (Toradol)). 60 Tab 0   • docusate sodium 100 MG Cap Take 100 mg by mouth 2 times a day as needed for Constipation. 60 Cap 0   • Prenatal Vit w/Ut-Bwttrhtxq-VP (PNV PO) Take 1 Tab by mouth.       No facility-administered encounter medications on file as of 2020.      Family history- no history of breast or ovarian cancer  Social history-   Social History     Socioeconomic History   • Marital status: Single     Spouse name: Not on file   • Number of children: Not on file   • Years of education: Not on file   • Highest education level: Not on file   Occupational History   • Not on file   Social Needs   • Financial resource strain: Not on file   • Food insecurity     Worry: Not on file     Inability: Not on file   • Transportation needs     Medical: Not on file     Non-medical: Not on file    Tobacco Use   • Smoking status: Never Smoker   • Smokeless tobacco: Never Used   Substance and Sexual Activity   • Alcohol use: Yes     Comment: first time today   • Drug use: No   • Sexual activity: Yes     Partners: Male   Lifestyle   • Physical activity     Days per week: Not on file     Minutes per session: Not on file   • Stress: Not on file   Relationships   • Social connections     Talks on phone: Not on file     Gets together: Not on file     Attends [employer] service: Not on file     Active member of club or organization: Not on file     Attends meetings of clubs or organizations: Not on file     Relationship status: Not on file   • Intimate partner violence     Fear of current or ex partner: Not on file     Emotionally abused: Not on file     Physically abused: Not on file     Forced sexual activity: Not on file   Other Topics Concern   • Not on file   Social History Narrative   • Not on file         Physical examination;   Alert and oriented x3  General-well-developed well-nourished female in no apparent distress  Vitals:    04/28/20 0850   BP: 120/74   Weight: 108.4 kg (239 lb)     Skin is warm and dry   HEENT-normocephalic,nontraumatic,PERRLA,EOMI  Throat- no edema or erythema  Neck-supple  Cardiovascular-regular rate and rhythm, normal S1-S2 without murmurs or gallops  Lungs-clear to auscultation bilaterally  Back-negative CVA tenderness  Abdomen-nondistended positive bowel sounds soft nontender without masses or hepatosplenomegaly  Pelvic examination;  External genitalia-without lesions  Vagina-no blood, no discharge  Cervix-closed,no gross lesions  Uterus-  20 weeks size, nontender  Adnexa- without mass or tenderness  Extremities-without cyanosis clubbing or edema  Neurologic-grossly intact    Transvaginal ultrasound; as performed and read by myself; hardy intrauterine pregnancy composite measurements consistent with 20-5/7 weeks EDC 9/10/2020+ cardiac motion no free pelvic fluid, no adnexal  masses anterior placenta    Impression;  Dysfunctional uterine bleeding-no evidence of ectopic or missed  Late care    Plan;   SS- too late  Needs initial OB      35 Minutes total spent with the patient in face-to-face contact,5 minutes of total time utilized to perform  Ultrasound, greater than 50% of the time has been spent on counseling and coordination of care.  Early DU B counseling performed-all questions answered in detail

## 2020-04-28 NOTE — NON-PROVIDER
Pt here DUB  LMP: 11/30/20 GA: 21w3d by LMP  + FM   States no issues at this moment   In clinic Pregnancy test: Positive  Good Ph# [phone]  Pharmacy confirmed w/ pt

## 2020-05-06 ENCOUNTER — HOSPITAL ENCOUNTER (OUTPATIENT)
Dept: LAB | Facility: MEDICAL CENTER | Age: 25
End: 2020-05-06
Attending: OBSTETRICS & GYNECOLOGY
Payer: COMMERCIAL

## 2020-05-06 ENCOUNTER — INITIAL PRENATAL (OUTPATIENT)
Dept: OBGYN | Facility: CLINIC | Age: 25
End: 2020-05-06
Payer: COMMERCIAL

## 2020-05-06 ENCOUNTER — HOSPITAL ENCOUNTER (OUTPATIENT)
Facility: MEDICAL CENTER | Age: 25
End: 2020-05-06
Attending: OBSTETRICS & GYNECOLOGY
Payer: COMMERCIAL

## 2020-05-06 VITALS
SYSTOLIC BLOOD PRESSURE: 136 MMHG | HEIGHT: 63 IN | DIASTOLIC BLOOD PRESSURE: 79 MMHG | BODY MASS INDEX: 42.84 KG/M2 | WEIGHT: 241.8 LBS

## 2020-05-06 DIAGNOSIS — O09.92 HIGH-RISK PREGNANCY IN SECOND TRIMESTER: ICD-10-CM

## 2020-05-06 DIAGNOSIS — O09.30 LATE PRENATAL CARE: ICD-10-CM

## 2020-05-06 LAB
ABO GROUP BLD: NORMAL
APPEARANCE UR: CLEAR
APPEARANCE UR: NORMAL
BACTERIA #/AREA URNS HPF: ABNORMAL /HPF
BASOPHILS # BLD AUTO: 0.3 % (ref 0–1.8)
BASOPHILS # BLD: 0.03 K/UL (ref 0–0.12)
BILIRUB UR QL STRIP.AUTO: NEGATIVE
BILIRUB UR STRIP-MCNC: NORMAL MG/DL
BLD GP AB SCN SERPL QL: NORMAL
COLOR UR AUTO: NORMAL
COLOR UR: YELLOW
EOSINOPHIL # BLD AUTO: 0.07 K/UL (ref 0–0.51)
EOSINOPHIL NFR BLD: 0.7 % (ref 0–6.9)
ERYTHROCYTE [DISTWIDTH] IN BLOOD BY AUTOMATED COUNT: 43 FL (ref 35.9–50)
GLUCOSE UR STRIP.AUTO-MCNC: NEGATIVE MG/DL
GLUCOSE UR STRIP.AUTO-MCNC: NEGATIVE MG/DL
HBV SURFACE AG SER QL: ABNORMAL
HCT VFR BLD AUTO: 40.3 % (ref 37–47)
HGB BLD-MCNC: 12.7 G/DL (ref 12–16)
HIV 1+2 AB+HIV1 P24 AG SERPL QL IA: NORMAL
IMM GRANULOCYTES # BLD AUTO: 0.12 K/UL (ref 0–0.11)
IMM GRANULOCYTES NFR BLD AUTO: 1.1 % (ref 0–0.9)
KETONES UR STRIP.AUTO-MCNC: NEGATIVE MG/DL
KETONES UR STRIP.AUTO-MCNC: NEGATIVE MG/DL
LEUKOCYTE ESTERASE UR QL STRIP.AUTO: NEGATIVE
LEUKOCYTE ESTERASE UR QL STRIP.AUTO: NORMAL
LYMPHOCYTES # BLD AUTO: 1.99 K/UL (ref 1–4.8)
LYMPHOCYTES NFR BLD: 18.8 % (ref 22–41)
MCH RBC QN AUTO: 28 PG (ref 27–33)
MCHC RBC AUTO-ENTMCNC: 31.5 G/DL (ref 33.6–35)
MCV RBC AUTO: 88.8 FL (ref 81.4–97.8)
MICRO URNS: ABNORMAL
MONOCYTES # BLD AUTO: 0.62 K/UL (ref 0–0.85)
MONOCYTES NFR BLD AUTO: 5.8 % (ref 0–13.4)
MUCOUS THREADS #/AREA URNS HPF: ABNORMAL /HPF
NEUTROPHILS # BLD AUTO: 7.78 K/UL (ref 2–7.15)
NEUTROPHILS NFR BLD: 73.3 % (ref 44–72)
NITRITE UR QL STRIP.AUTO: NEGATIVE
NITRITE UR QL STRIP.AUTO: NEGATIVE
NRBC # BLD AUTO: 0 K/UL
NRBC BLD-RTO: 0 /100 WBC
PH UR STRIP.AUTO: 6 [PH] (ref 5–8)
PH UR STRIP.AUTO: 6.5 [PH] (ref 5–8)
PLATELET # BLD AUTO: 193 K/UL (ref 164–446)
PMV BLD AUTO: 11.5 FL (ref 9–12.9)
PROT UR QL STRIP: NEGATIVE MG/DL
PROT UR QL STRIP: NEGATIVE MG/DL
RBC # BLD AUTO: 4.54 M/UL (ref 4.2–5.4)
RBC # URNS HPF: ABNORMAL /HPF
RBC UR QL AUTO: ABNORMAL
RBC UR QL AUTO: NORMAL
RH BLD: NORMAL
RUBV AB SER QL: 307 IU/ML
SP GR UR STRIP.AUTO: 1.02
SP GR UR STRIP.AUTO: 1.02
TREPONEMA PALLIDUM IGG+IGM AB [PRESENCE] IN SERUM OR PLASMA BY IMMUNOASSAY: ABNORMAL
UROBILINOGEN UR STRIP-MCNC: NORMAL MG/DL
UROBILINOGEN UR STRIP.AUTO-MCNC: 1 MG/DL
WBC # BLD AUTO: 10.6 K/UL (ref 4.8–10.8)
WBC #/AREA URNS HPF: ABNORMAL /HPF

## 2020-05-06 PROCEDURE — 87591 N.GONORRHOEAE DNA AMP PROB: CPT

## 2020-05-06 PROCEDURE — 87491 CHLMYD TRACH DNA AMP PROBE: CPT

## 2020-05-06 PROCEDURE — 86762 RUBELLA ANTIBODY: CPT

## 2020-05-06 PROCEDURE — 87389 HIV-1 AG W/HIV-1&-2 AB AG IA: CPT

## 2020-05-06 PROCEDURE — 87340 HEPATITIS B SURFACE AG IA: CPT

## 2020-05-06 PROCEDURE — 81002 URINALYSIS NONAUTO W/O SCOPE: CPT | Performed by: OBSTETRICS & GYNECOLOGY

## 2020-05-06 PROCEDURE — 85025 COMPLETE CBC W/AUTO DIFF WBC: CPT

## 2020-05-06 PROCEDURE — 36415 COLL VENOUS BLD VENIPUNCTURE: CPT

## 2020-05-06 PROCEDURE — 81001 URINALYSIS AUTO W/SCOPE: CPT

## 2020-05-06 PROCEDURE — 88175 CYTOPATH C/V AUTO FLUID REDO: CPT

## 2020-05-06 PROCEDURE — 86901 BLOOD TYPING SEROLOGIC RH(D): CPT

## 2020-05-06 PROCEDURE — 59402 PR NEW OB HIGH RISK: CPT | Performed by: OBSTETRICS & GYNECOLOGY

## 2020-05-06 PROCEDURE — 86780 TREPONEMA PALLIDUM: CPT

## 2020-05-06 PROCEDURE — 86900 BLOOD TYPING SEROLOGIC ABO: CPT

## 2020-05-06 PROCEDURE — 86850 RBC ANTIBODY SCREEN: CPT

## 2020-05-06 ASSESSMENT — EDINBURGH POSTNATAL DEPRESSION SCALE (EPDS)
I HAVE BLAMED MYSELF UNNECESSARILY WHEN THINGS WENT WRONG: NOT VERY OFTEN
I HAVE FELT SAD OR MISERABLE: NO, NOT AT ALL
I HAVE LOOKED FORWARD WITH ENJOYMENT TO THINGS: AS MUCH AS I EVER DID
I HAVE BEEN ABLE TO LAUGH AND SEE THE FUNNY SIDE OF THINGS: AS MUCH AS I ALWAYS COULD
THINGS HAVE BEEN GETTING ON TOP OF ME: NO, I HAVE BEEN COPING AS WELL AS EVER
I HAVE BEEN SO UNHAPPY THAT I HAVE BEEN CRYING: NO, NEVER
I HAVE BEEN ANXIOUS OR WORRIED FOR NO GOOD REASON: HARDLY EVER
I HAVE BEEN SO UNHAPPY THAT I HAVE HAD DIFFICULTY SLEEPING: NOT AT ALL
I HAVE FELT SCARED OR PANICKY FOR NO GOOD REASON: NO, NOT AT ALL
TOTAL SCORE: 2
THE THOUGHT OF HARMING MYSELF HAS OCCURRED TO ME: NEVER

## 2020-05-06 NOTE — PROCEDURES
Initial OB;    Aleja Sanches is 25 y.o.  female ,Patient's last menstrual period was 2019 (exact date). at 22w4d. She denies current complaints.    Past OB history-  OB History    Para Term  AB Living   5 3 3 0 1 3   SAB TAB Ectopic Molar Multiple Live Births   1         3      # Outcome Date GA Lbr [place]/2nd Weight Sex Delivery Anes PTL Lv   5 Current            4 Term  40w0d  4.132 kg (9 lb 1.8 oz) F Vag-Spont      3 Term  40w0d  4.082 kg (9 lb) M Vag-Spont   TIMOTHY   2 Term  40w0d  3.799 kg (8 lb 6 oz) M Vag-Spont   TIMOTHY   1 SAB              Past medical history-History reviewed. No pertinent past medical history.  Past surgical history-History reviewed. No pertinent surgical history.  Allergies-Patient has no known allergies.  Medications-  No current facility-administered medications for this visit.      Last reviewed on 2020  2:34 PM by Chau Sarabia M.D.        Size equals dates, positive fetal heart tones    See prenatal physical;    Impression;  Viable pregnancy at 22 weeks  Late prenatal care    Plan;  Followup in 4 weeks

## 2020-05-06 NOTE — PROGRESS NOTES
NOB visit  LMP:11/30/2019  :4/28/2020    EMMA:9/5/2020  Good Phone #:[phone]  Pharmacy verified.  Last Pap:2017, neg result per Pt.  Reports + FM.  C/o: Pt states no other issues or complaints for today.  EPDS filled out.

## 2020-05-07 ENCOUNTER — HOSPITAL ENCOUNTER (OUTPATIENT)
Dept: RADIOLOGY | Facility: MEDICAL CENTER | Age: 25
End: 2020-05-07
Attending: OBSTETRICS & GYNECOLOGY
Payer: COMMERCIAL

## 2020-05-07 DIAGNOSIS — O09.30 LATE PRENATAL CARE: ICD-10-CM

## 2020-05-07 PROCEDURE — 76805 OB US >/= 14 WKS SNGL FETUS: CPT

## 2020-05-08 LAB
C TRACH DNA GENITAL QL NAA+PROBE: NEGATIVE
CYTOLOGY REG CYTOL: NORMAL
N GONORRHOEA DNA GENITAL QL NAA+PROBE: NEGATIVE
SPECIMEN SOURCE: NORMAL

## 2020-09-05 ENCOUNTER — HOSPITAL ENCOUNTER (OUTPATIENT)
Facility: MEDICAL CENTER | Age: 25
End: 2020-09-05
Attending: OBSTETRICS & GYNECOLOGY | Admitting: OBSTETRICS & GYNECOLOGY
Payer: COMMERCIAL

## 2020-09-05 VITALS
TEMPERATURE: 97.3 F | DIASTOLIC BLOOD PRESSURE: 56 MMHG | SYSTOLIC BLOOD PRESSURE: 117 MMHG | HEIGHT: 63 IN | RESPIRATION RATE: 18 BRPM | BODY MASS INDEX: 40.75 KG/M2 | WEIGHT: 230 LBS | HEART RATE: 105 BPM

## 2020-09-05 LAB — A1 MICROGLOB PLACENTAL VAG QL: NEGATIVE

## 2020-09-05 PROCEDURE — 59025 FETAL NON-STRESS TEST: CPT

## 2020-09-05 PROCEDURE — 84112 EVAL AMNIOTIC FLUID PROTEIN: CPT

## 2020-09-05 PROCEDURE — 87150 DNA/RNA AMPLIFIED PROBE: CPT

## 2020-09-05 PROCEDURE — 87081 CULTURE SCREEN ONLY: CPT

## 2020-09-05 RX ORDER — MISOPROSTOL 200 UG/1
800 TABLET ORAL
Status: DISCONTINUED | OUTPATIENT
Start: 2020-09-05 | End: 2020-09-05

## 2020-09-05 RX ORDER — OXYCODONE HYDROCHLORIDE AND ACETAMINOPHEN 5; 325 MG/1; MG/1
1 TABLET ORAL EVERY 4 HOURS PRN
Status: CANCELLED | OUTPATIENT
Start: 2020-09-05

## 2020-09-05 RX ORDER — METHYLERGONOVINE MALEATE 0.2 MG/ML
0.2 INJECTION INTRAVENOUS
Status: DISCONTINUED | OUTPATIENT
Start: 2020-09-05 | End: 2020-09-05

## 2020-09-05 RX ORDER — ACETAMINOPHEN 325 MG/1
325 TABLET ORAL EVERY 4 HOURS PRN
Status: CANCELLED | OUTPATIENT
Start: 2020-09-05

## 2020-09-05 RX ORDER — ONDANSETRON 2 MG/ML
4 INJECTION INTRAMUSCULAR; INTRAVENOUS EVERY 6 HOURS PRN
Status: CANCELLED | OUTPATIENT
Start: 2020-09-05

## 2020-09-05 RX ORDER — IBUPROFEN 600 MG/1
600 TABLET ORAL EVERY 6 HOURS PRN
Status: CANCELLED | OUTPATIENT
Start: 2020-09-05

## 2020-09-05 RX ORDER — OXYCODONE AND ACETAMINOPHEN 10; 325 MG/1; MG/1
1 TABLET ORAL EVERY 4 HOURS PRN
Status: CANCELLED | OUTPATIENT
Start: 2020-09-05

## 2020-09-05 RX ORDER — ONDANSETRON 4 MG/1
4 TABLET, ORALLY DISINTEGRATING ORAL EVERY 6 HOURS PRN
Status: CANCELLED | OUTPATIENT
Start: 2020-09-05

## 2020-09-05 RX ORDER — SODIUM CHLORIDE, SODIUM LACTATE, POTASSIUM CHLORIDE, CALCIUM CHLORIDE 600; 310; 30; 20 MG/100ML; MG/100ML; MG/100ML; MG/100ML
INJECTION, SOLUTION INTRAVENOUS CONTINUOUS
Status: DISCONTINUED | OUTPATIENT
Start: 2020-09-05 | End: 2020-09-05

## 2020-09-05 NOTE — PROGRESS NOTES
EDC - 20 EGA 40wks    0045 - Pt arrived to labor and delivery for LOF. Pt placed in room S224.   0055 - External monitors in place x 2. VSS. Pt reports good fetal movement. C/o LOF No complaints of contractions or vaginal bleeding. SVE 3. FOB at bedside. POC discussed with pt and family members, all questions answered.   0130 Damien WU notified of presentation, no GBS status.  0200 -amnisure negative, GBS collected and sent to lab

## 2020-09-05 NOTE — H&P
History and Physical    Aleja Sanches is a 25 y.o. female  -Para:     Gestational Age:  40w0d  Admitted for:   Active Labor  Admitted to  [facility] Labor and Delivery.  Patient received prenatal care: [facility] 1 visit only    HPI: Patient is admitted with the above mentioned Chief Complaint and States   Loss of fluid:   positive  Abdominal Pain:  negative  Uterine Contractions:  positive  Vaginal Bleeding:  negative  Fetal Movement:  normal  Patient denies fever, chills, nausea, vomiting , headache, visual disturbance, or dysuria  Patient's last menstrual period was 2019 (exact date).  Estimated Date of Delivery: 20  Final EMMA: 2020, by Last Menstrual Period    Patient Active Problem List    Diagnosis Date Noted   • Labor and delivery, indication for care 2017   • Labor and delivery indication for care or intervention 2016       Admitting DX: Pregnancy   Pregnancy Complications:  Non compliance  OB Risk Factors:   Hx of macrosomia  Labor State:    SROM and Early latent labor.    History:   has no past medical history of Addisons disease ([facility]), Adrenal disorder ([facility]), Allergy, Anemia, Anxiety, Arrhythmia, Arthritis, Asthma, Blood transfusion without reported diagnosis, Cancer ([facility]), Cataract, CHF (congestive heart failure) ([facility]), Clotting disorder ([facility]), COPD (chronic obstructive pulmonary disease) ([facility]), Cushings syndrome ([facility]), Depression, Diabetes ([facility]), Diabetic neuropathy ([facility]), GERD (gastroesophageal reflux disease), Glaucoma, Goiter, Head ache, Heart attack ([facility]), Heart murmur, HIV (human immunodeficiency virus infection) ([facility]), Hyperlipidemia, Hypertension, IBD (inflammatory bowel disease), Kidney disease, Meningitis, Migraine, Muscle disorder, Osteoporosis, Parathyroid disorder ([facility]), Pituitary disease ([facility]), Pulmonary emphysema ([facility]), Seizure ([facility]), Sickle cell disease ([facility]), Stroke ([facility]), Substance abuse ([facility]), Thyroid disease,  "Tuberculosis, or Urinary tract infection.     has no past surgical history on file.    OB History    Para Term  AB Living   5 3 3 0 1 3   SAB TAB Ectopic Molar Multiple Live Births   1         3      # Outcome Date GA Lbr [place]/2nd Weight Sex Delivery Anes PTL Lv   5 Current            4 Term  40w0d  4.132 kg (9 lb 1.8 oz) F Vag-Spont None     3 Term  40w0d  4.082 kg (9 lb) M Vag-Spont   TIMOTHY   2 Term  40w0d  3.799 kg (8 lb 6 oz) M Vag-Spont   TIMOTHY   1 SAB                Medications:  No current facility-administered medications on file prior to encounter.      Current Outpatient Medications on File Prior to Encounter   Medication Sig Dispense Refill   • ibuprofen (MOTRIN) 600 MG Tab Take 1 Tab by mouth every 6 hours as needed (For cramping after delivery; do not give if patient is receiving ketorolac (Toradol)). 60 Tab 0   • docusate sodium 100 MG Cap Take 100 mg by mouth 2 times a day as needed for Constipation. 60 Cap 0   • Prenatal Vit w/Ca-Ilqfrmkie-JJ (PNV PO) Take 1 Tab by mouth.         Allergies:  Patient has no known allergies.    ROS:   Neuro: negative    Cardiovascular: negative  Gastro intestinal: negative  Genitourinary: negative            Physical Exam:  /56   Pulse (!) 105   Temp 36.3 °C (97.3 °F) (Oral)   Resp 18   Ht 1.6 m (5' 3\")   Wt 104.3 kg (230 lb)   LMP 2019 (Exact Date)   BMI 40.74 kg/m²   Constitutional: healthy-appearing, Well-developed, well-nourished  No JVD: while supine  HEENT: PERRLA  Breast Exam: negative  Cardio: regular rate and rhythm  Lung: unlabored respirations, no intercostal retractions or accessory muscle use  Abdomen: abdomen is soft without significant tenderness, masses, organomegaly or guarding  Extremity: extremities, peripheral pulses and reflexes normal    Cervical Exam: 30%  Cervix Dilatation: 1  Station: negative 3  Pelvis: Normal  Fetal Assessment: Fetal heart variability: moderate  Fetal Heart Rate decelerations: " none  Fetal Heart Rate accelerations: yes  Baseline FHR: 140 per minute  Uterine contractions: irregular  Estimated Fetal Weight: 3500 - 4000g      Labs:      Prenatal Results     General (Most Recent Result)     Test Value Date Time    ABO O  05/06/20 1613    Rh POS  05/06/20 1613    Antibody screen NEG  05/06/20 1613    HbA1c       Gonorrhea Negative  05/06/20 1505    Chlamydia  by PCR Negative  05/06/20 1505    Chlamydia by DNA       RPR/Syphilus Non-Reactive  05/06/20 1613    HSV 1/2 by PCR (non-serum)       HSV 1/2 (serum)       HSV 1       HSV 2       HPV (16)       HPV (18)       HPV (other)       HBsAg Non-Reactive  05/06/20 1613    HIV-1 HIV-2 Antibodies Non-Reactive  05/06/20 1613    Rubella 307.00 IU/mL 05/06/20 1613    Tb             Pap Smear (Most Recent Result)     Test Value Date Time    Pap smear       Pap smear w/HPV       Pap smear w/CTNG       Pap smar w/HPV CTNG       Pap smear (reflex HPV ACUS)       Pap smear (reflex HPV ASCUS w/CTNG)  (See Report)   05/06/20 1505    Pathology gyn specimen  (See Report)   05/06/20 1505          Urinalysis (Most Recent Result)     Test Value Date Time    Urinalysis  Abnormal    (See Report)   01/07/16 2120    Urinalysis (culture if indicated)       POC urinalysis  (See Report)   05/06/20 1441    Urine drug screen  (See Report)   01/07/16 2120    Urine drug screen (w/o conf)       Urine culture ([id number])       Urine culture ([id number])             1st Trimester     Test Value Date Time    Hgb       Hct       Fasting Glucose Tolerance       GTT, 1 hour       GTT, 2 hours       GTT, 3 hours             2nd Trimester     Test Value Date Time    Hgb 12.7 g/dL 05/06/20 1613    Hct 40.3 % 05/06/20 1613    Urinalysis       Urine Culture       AST       ALT       Uric Acid       Fasting Glucose Tolerance       GTT, 1 hour       GTT, 2 hours       GTT, 3 hours       Urine Protein/Creatinine Ratio             3rd Trimester     Test Value Date Time    Hgb       Hct        Platelet count       GBS (JARA BROTH)       GBS (Direct)       Urinalysis       Urine Culture       Urine Drug Screen       Urine Protein/Creatinine Ratio             Congenital Disease Screening     Test Value Date Time    First Trimester Screen       Quad Screen       Sickle Cell       Thalassemia       [facility]       Cystic Fibrosis Carrier Study                     Assessment:  Gestational Age:  40w0d  Labor State:   Labor, Active  Risk Factors:   Non-compliance  Pregnancy Complications: none    Patient Active Problem List    Diagnosis Date Noted   • Labor and delivery, indication for care 2017   • Labor and delivery indication for care or intervention 2016       Plan:   Admitted for: Active labor, SROM. GBS unknown. Augment with pitocin, pain control, anticipate .     Damien Campbell, PLuisA.

## 2020-09-06 LAB — GP B STREP DNA SPEC QL NAA+PROBE: NEGATIVE

## 2020-09-07 ENCOUNTER — HOSPITAL ENCOUNTER (OUTPATIENT)
Facility: MEDICAL CENTER | Age: 25
End: 2020-09-07
Attending: OBSTETRICS & GYNECOLOGY | Admitting: OBSTETRICS & GYNECOLOGY
Payer: COMMERCIAL

## 2020-09-07 VITALS
HEIGHT: 63 IN | SYSTOLIC BLOOD PRESSURE: 132 MMHG | TEMPERATURE: 98.2 F | HEART RATE: 92 BPM | BODY MASS INDEX: 46.6 KG/M2 | DIASTOLIC BLOOD PRESSURE: 71 MMHG | RESPIRATION RATE: 16 BRPM | WEIGHT: 263 LBS | OXYGEN SATURATION: 97 %

## 2020-09-07 PROCEDURE — 59025 FETAL NON-STRESS TEST: CPT

## 2020-09-07 PROCEDURE — 59025 FETAL NON-STRESS TEST: CPT | Mod: 26 | Performed by: OBSTETRICS & GYNECOLOGY

## 2020-09-08 NOTE — PROCEDURES
NST per my read:  Indications: contractions/pain    FHR baseline: 140s  Accelerations present without decelerations  Moderate FHR variability present  [place]: Irregular contractions noted  NST is reactive

## 2020-09-11 ENCOUNTER — HOSPITAL ENCOUNTER (OUTPATIENT)
Facility: MEDICAL CENTER | Age: 25
End: 2020-09-11
Attending: OBSTETRICS & GYNECOLOGY | Admitting: OBSTETRICS & GYNECOLOGY
Payer: COMMERCIAL

## 2020-09-11 VITALS
RESPIRATION RATE: 18 BRPM | HEART RATE: 91 BPM | BODY MASS INDEX: 46.6 KG/M2 | DIASTOLIC BLOOD PRESSURE: 56 MMHG | SYSTOLIC BLOOD PRESSURE: 117 MMHG | HEIGHT: 63 IN | OXYGEN SATURATION: 98 % | TEMPERATURE: 98 F | WEIGHT: 263 LBS

## 2020-09-11 LAB
COVID ORDER STATUS COVID19: NORMAL
SARS-COV-2 RNA RESP QL NAA+PROBE: NOTDETECTED
SPECIMEN SOURCE: NORMAL

## 2020-09-11 PROCEDURE — 59025 FETAL NON-STRESS TEST: CPT

## 2020-09-11 PROCEDURE — U0003 INFECTIOUS AGENT DETECTION BY NUCLEIC ACID (DNA OR RNA); SEVERE ACUTE RESPIRATORY SYNDROME CORONAVIRUS 2 (SARS-COV-2) (CORONAVIRUS DISEASE [COVID-19]), AMPLIFIED PROBE TECHNIQUE, MAKING USE OF HIGH THROUGHPUT TECHNOLOGIES AS DESCRIBED BY CMS-2020-01-R: HCPCS

## 2020-09-11 PROCEDURE — C9803 HOPD COVID-19 SPEC COLLECT: HCPCS | Performed by: OBSTETRICS & GYNECOLOGY

## 2020-09-11 NOTE — PROGRESS NOTES
0119- Pt presented to labor unit for UC's that started to become intense at 0000. Denies LOF, VB. Abd soft and non tender to touch. EFM and TOCO applied. Confirms fetal movements. Pt states she was 2cm in office on Monday. SVE 2/60/-2, posterior.    0135- Dr. Ellis notified of pt status. Order received to recheck cervix in one hour. If unchanged pt to discharge home and return for IOL on Sept. 12 at 0800. IOL scheduled with . POC discussed with pt. Pt verbalized understanding.     0220- SVE unchanged. Reactive fetal heart tracing noted. Covid swab collected and sent. Pt agreed to self quarantine.    0225- Discharge instructions given and explained. Pt verbalized understanding. Covid instructions given and explained.     0226- Pt discharged home undelivered with FOB.

## 2020-09-12 ENCOUNTER — HOSPITAL ENCOUNTER (INPATIENT)
Facility: MEDICAL CENTER | Age: 25
LOS: 2 days | End: 2020-09-14
Attending: OBSTETRICS & GYNECOLOGY | Admitting: OBSTETRICS & GYNECOLOGY
Payer: COMMERCIAL

## 2020-09-12 ENCOUNTER — APPOINTMENT (OUTPATIENT)
Dept: OBGYN | Facility: MEDICAL CENTER | Age: 25
End: 2020-09-12
Attending: OBSTETRICS & GYNECOLOGY
Payer: COMMERCIAL

## 2020-09-12 LAB
BASOPHILS # BLD AUTO: 0.2 % (ref 0–1.8)
BASOPHILS # BLD: 0.02 K/UL (ref 0–0.12)
EOSINOPHIL # BLD AUTO: 0.08 K/UL (ref 0–0.51)
EOSINOPHIL NFR BLD: 0.8 % (ref 0–6.9)
ERYTHROCYTE [DISTWIDTH] IN BLOOD BY AUTOMATED COUNT: 43.6 FL (ref 35.9–50)
HCT VFR BLD AUTO: 41.3 % (ref 37–47)
HGB BLD-MCNC: 12.8 G/DL (ref 12–16)
HOLDING TUBE BB 8507: NORMAL
IMM GRANULOCYTES # BLD AUTO: 0.1 K/UL (ref 0–0.11)
IMM GRANULOCYTES NFR BLD AUTO: 1 % (ref 0–0.9)
LYMPHOCYTES # BLD AUTO: 1.78 K/UL (ref 1–4.8)
LYMPHOCYTES NFR BLD: 18.6 % (ref 22–41)
MCH RBC QN AUTO: 26.6 PG (ref 27–33)
MCHC RBC AUTO-ENTMCNC: 31 G/DL (ref 33.6–35)
MCV RBC AUTO: 85.7 FL (ref 81.4–97.8)
MONOCYTES # BLD AUTO: 0.71 K/UL (ref 0–0.85)
MONOCYTES NFR BLD AUTO: 7.4 % (ref 0–13.4)
NEUTROPHILS # BLD AUTO: 6.9 K/UL (ref 2–7.15)
NEUTROPHILS NFR BLD: 72 % (ref 44–72)
NRBC # BLD AUTO: 0 K/UL
NRBC BLD-RTO: 0 /100 WBC
PLATELET # BLD AUTO: 207 K/UL (ref 164–446)
PMV BLD AUTO: 11.6 FL (ref 9–12.9)
RBC # BLD AUTO: 4.82 M/UL (ref 4.2–5.4)
WBC # BLD AUTO: 9.6 K/UL (ref 4.8–10.8)

## 2020-09-12 PROCEDURE — A9270 NON-COVERED ITEM OR SERVICE: HCPCS | Performed by: OBSTETRICS & GYNECOLOGY

## 2020-09-12 PROCEDURE — 36415 COLL VENOUS BLD VENIPUNCTURE: CPT

## 2020-09-12 PROCEDURE — 10907ZC DRAINAGE OF AMNIOTIC FLUID, THERAPEUTIC FROM PRODUCTS OF CONCEPTION, VIA NATURAL OR ARTIFICIAL OPENING: ICD-10-PCS | Performed by: OBSTETRICS & GYNECOLOGY

## 2020-09-12 PROCEDURE — 700111 HCHG RX REV CODE 636 W/ 250 OVERRIDE (IP): Performed by: OBSTETRICS & GYNECOLOGY

## 2020-09-12 PROCEDURE — 700105 HCHG RX REV CODE 258: Performed by: OBSTETRICS & GYNECOLOGY

## 2020-09-12 PROCEDURE — 304965 HCHG RECOVERY SERVICES

## 2020-09-12 PROCEDURE — 85025 COMPLETE CBC W/AUTO DIFF WBC: CPT

## 2020-09-12 PROCEDURE — 59410 OBSTETRICAL CARE: CPT | Performed by: OBSTETRICS & GYNECOLOGY

## 2020-09-12 PROCEDURE — 59409 OBSTETRICAL CARE: CPT

## 2020-09-12 PROCEDURE — 770002 HCHG ROOM/CARE - OB PRIVATE (112)

## 2020-09-12 PROCEDURE — 700102 HCHG RX REV CODE 250 W/ 637 OVERRIDE(OP): Performed by: OBSTETRICS & GYNECOLOGY

## 2020-09-12 PROCEDURE — 3E033VJ INTRODUCTION OF OTHER HORMONE INTO PERIPHERAL VEIN, PERCUTANEOUS APPROACH: ICD-10-PCS | Performed by: OBSTETRICS & GYNECOLOGY

## 2020-09-12 RX ORDER — SODIUM CHLORIDE, SODIUM LACTATE, POTASSIUM CHLORIDE, CALCIUM CHLORIDE 600; 310; 30; 20 MG/100ML; MG/100ML; MG/100ML; MG/100ML
INJECTION, SOLUTION INTRAVENOUS CONTINUOUS
Status: ACTIVE | OUTPATIENT
Start: 2020-09-12 | End: 2020-09-12

## 2020-09-12 RX ORDER — ONDANSETRON 4 MG/1
4 TABLET, ORALLY DISINTEGRATING ORAL EVERY 6 HOURS PRN
Status: DISCONTINUED | OUTPATIENT
Start: 2020-09-12 | End: 2020-09-14 | Stop reason: HOSPADM

## 2020-09-12 RX ORDER — HYDROCODONE BITARTRATE AND ACETAMINOPHEN 5; 325 MG/1; MG/1
1 TABLET ORAL EVERY 4 HOURS PRN
Status: DISCONTINUED | OUTPATIENT
Start: 2020-09-12 | End: 2020-09-14 | Stop reason: HOSPADM

## 2020-09-12 RX ORDER — SODIUM CHLORIDE, SODIUM LACTATE, POTASSIUM CHLORIDE, CALCIUM CHLORIDE 600; 310; 30; 20 MG/100ML; MG/100ML; MG/100ML; MG/100ML
INJECTION, SOLUTION INTRAVENOUS PRN
Status: DISCONTINUED | OUTPATIENT
Start: 2020-09-12 | End: 2020-09-14 | Stop reason: HOSPADM

## 2020-09-12 RX ORDER — MISOPROSTOL 200 UG/1
800 TABLET ORAL
Status: DISCONTINUED | OUTPATIENT
Start: 2020-09-12 | End: 2020-09-12 | Stop reason: HOSPADM

## 2020-09-12 RX ORDER — METHYLERGONOVINE MALEATE 0.2 MG/ML
0.2 INJECTION INTRAVENOUS
Status: DISCONTINUED | OUTPATIENT
Start: 2020-09-12 | End: 2020-09-14 | Stop reason: HOSPADM

## 2020-09-12 RX ORDER — VITAMIN A ACETATE, BETA CAROTENE, ASCORBIC ACID, CHOLECALCIFEROL, .ALPHA.-TOCOPHEROL ACETATE, DL-, THIAMINE MONONITRATE, RIBOFLAVIN, NIACINAMIDE, PYRIDOXINE HYDROCHLORIDE, FOLIC ACID, CYANOCOBALAMIN, CALCIUM CARBONATE, FERROUS FUMARATE, ZINC OXIDE, CUPRIC OXIDE 3080; 12; 120; 400; 1; 1.84; 3; 20; 22; 920; 25; 200; 27; 10; 2 [IU]/1; UG/1; MG/1; [IU]/1; MG/1; MG/1; MG/1; MG/1; MG/1; [IU]/1; MG/1; MG/1; MG/1; MG/1; MG/1
1 TABLET, FILM COATED ORAL EVERY MORNING
Status: DISCONTINUED | OUTPATIENT
Start: 2020-09-13 | End: 2020-09-14 | Stop reason: HOSPADM

## 2020-09-12 RX ORDER — HYDROCODONE BITARTRATE AND ACETAMINOPHEN 10; 325 MG/1; MG/1
1 TABLET ORAL EVERY 4 HOURS PRN
Status: DISCONTINUED | OUTPATIENT
Start: 2020-09-12 | End: 2020-09-14 | Stop reason: HOSPADM

## 2020-09-12 RX ORDER — DOCUSATE SODIUM 100 MG/1
100 CAPSULE, LIQUID FILLED ORAL 2 TIMES DAILY PRN
Status: DISCONTINUED | OUTPATIENT
Start: 2020-09-12 | End: 2020-09-14 | Stop reason: HOSPADM

## 2020-09-12 RX ORDER — ONDANSETRON 2 MG/ML
4 INJECTION INTRAMUSCULAR; INTRAVENOUS EVERY 6 HOURS PRN
Status: DISCONTINUED | OUTPATIENT
Start: 2020-09-12 | End: 2020-09-14 | Stop reason: HOSPADM

## 2020-09-12 RX ORDER — CARBOPROST TROMETHAMINE 250 UG/ML
250 INJECTION, SOLUTION INTRAMUSCULAR
Status: DISCONTINUED | OUTPATIENT
Start: 2020-09-12 | End: 2020-09-14 | Stop reason: HOSPADM

## 2020-09-12 RX ORDER — MISOPROSTOL 200 UG/1
600 TABLET ORAL
Status: DISCONTINUED | OUTPATIENT
Start: 2020-09-12 | End: 2020-09-14 | Stop reason: HOSPADM

## 2020-09-12 RX ORDER — IBUPROFEN 800 MG/1
800 TABLET ORAL EVERY 8 HOURS PRN
Status: DISCONTINUED | OUTPATIENT
Start: 2020-09-12 | End: 2020-09-14 | Stop reason: HOSPADM

## 2020-09-12 RX ORDER — BISACODYL 10 MG
10 SUPPOSITORY, RECTAL RECTAL PRN
Status: DISCONTINUED | OUTPATIENT
Start: 2020-09-12 | End: 2020-09-14 | Stop reason: HOSPADM

## 2020-09-12 RX ADMIN — OXYTOCIN 2 MILLI-UNITS/MIN: 10 INJECTION, SOLUTION INTRAMUSCULAR; INTRAVENOUS at 13:18

## 2020-09-12 RX ADMIN — HYDROCODONE BITARTRATE AND ACETAMINOPHEN 1 TABLET: 5; 325 TABLET ORAL at 19:51

## 2020-09-12 RX ADMIN — OXYTOCIN 125 ML/HR: 10 INJECTION, SOLUTION INTRAMUSCULAR; INTRAVENOUS at 20:18

## 2020-09-12 RX ADMIN — SODIUM CHLORIDE, POTASSIUM CHLORIDE, SODIUM LACTATE AND CALCIUM CHLORIDE: 600; 310; 30; 20 INJECTION, SOLUTION INTRAVENOUS at 13:18

## 2020-09-12 RX ADMIN — OXYTOCIN 2000 ML/HR: 10 INJECTION, SOLUTION INTRAMUSCULAR; INTRAVENOUS at 19:23

## 2020-09-12 RX ADMIN — FENTANYL CITRATE 100 MCG: 50 INJECTION INTRAMUSCULAR; INTRAVENOUS at 17:08

## 2020-09-12 SDOH — ECONOMIC STABILITY: TRANSPORTATION INSECURITY
IN THE PAST 12 MONTHS, HAS THE LACK OF TRANSPORTATION KEPT YOU FROM MEDICAL APPOINTMENTS OR FROM GETTING MEDICATIONS?: PATIENT DECLINED

## 2020-09-12 SDOH — ECONOMIC STABILITY: FOOD INSECURITY: WITHIN THE PAST 12 MONTHS, THE FOOD YOU BOUGHT JUST DIDN'T LAST AND YOU DIDN'T HAVE MONEY TO GET MORE.: PATIENT DECLINED

## 2020-09-12 SDOH — ECONOMIC STABILITY: TRANSPORTATION INSECURITY
IN THE PAST 12 MONTHS, HAS LACK OF TRANSPORTATION KEPT YOU FROM MEETINGS, WORK, OR FROM GETTING THINGS NEEDED FOR DAILY LIVING?: PATIENT DECLINED

## 2020-09-12 SDOH — ECONOMIC STABILITY: FOOD INSECURITY: WITHIN THE PAST 12 MONTHS, YOU WORRIED THAT YOUR FOOD WOULD RUN OUT BEFORE YOU GOT MONEY TO BUY MORE.: PATIENT DECLINED

## 2020-09-12 ASSESSMENT — PAIN SCALES - GENERAL: PAINLEVEL: 0 - NO PAIN

## 2020-09-12 ASSESSMENT — EDINBURGH POSTNATAL DEPRESSION SCALE (EPDS)
I HAVE BEEN ANXIOUS OR WORRIED FOR NO GOOD REASON: NO, NOT AT ALL
I HAVE BEEN ABLE TO LAUGH AND SEE THE FUNNY SIDE OF THINGS: AS MUCH AS I ALWAYS COULD
THINGS HAVE BEEN GETTING ON TOP OF ME: NO, I HAVE BEEN COPING AS WELL AS EVER
THE THOUGHT OF HARMING MYSELF HAS OCCURRED TO ME: NEVER
I HAVE BEEN SO UNHAPPY THAT I HAVE BEEN CRYING: NO, NEVER
I HAVE FELT SAD OR MISERABLE: NO, NOT AT ALL
I HAVE FELT SCARED OR PANICKY FOR NO GOOD REASON: NO, NOT AT ALL
I HAVE BEEN SO UNHAPPY THAT I HAVE HAD DIFFICULTY SLEEPING: NOT AT ALL
I HAVE LOOKED FORWARD WITH ENJOYMENT TO THINGS: AS MUCH AS I EVER DID
I HAVE BLAMED MYSELF UNNECESSARILY WHEN THINGS WENT WRONG: NO, NEVER

## 2020-09-12 ASSESSMENT — LIFESTYLE VARIABLES: EVER_SMOKED: NEVER

## 2020-09-12 ASSESSMENT — PATIENT HEALTH QUESTIONNAIRE - PHQ9
SUM OF ALL RESPONSES TO PHQ9 QUESTIONS 1 AND 2: 0
2. FEELING DOWN, DEPRESSED, IRRITABLE, OR HOPELESS: NOT AT ALL
1. LITTLE INTEREST OR PLEASURE IN DOING THINGS: NOT AT ALL
SUM OF ALL RESPONSES TO PHQ9 QUESTIONS 1 AND 2: 0
2. FEELING DOWN, DEPRESSED, IRRITABLE, OR HOPELESS: NOT AT ALL
1. LITTLE INTEREST OR PLEASURE IN DOING THINGS: NOT AT ALL

## 2020-09-12 NOTE — H&P
History and Physical    Aleja Adilene Sanches is a 25 y.o. female  at 41w0d who presents for elective IOL at term     Subjective:   CTXs: positive - 5-15 minutes apart   Pain: positive  LOF: negative  Vaginal bleeding: positive - blood tinged mucus   Fetal movement: positive - normal, denies decrease in movement     ROS: Pertinent positives documented in HPI and all other systems reviewed & are negative    OB History    Para Term  AB Living   5 3 3 0 1 3   SAB TAB Ectopic Molar Multiple Live Births   1         3      # Outcome Date GA Lbr [place]/2nd Weight Sex Delivery Anes PTL Lv   5 Current            4 Term  40w0d  4.132 kg (9 lb 1.8 oz) F Vag-Spont None     3 Term  40w0d  4.082 kg (9 lb) M Vag-Spont   TIMOTHY   2 Term  40w0d  3.799 kg (8 lb 6 oz) M Vag-Spont   TIMOTHY   1 SAB                PGYNHx: Patient denies any prenatal issues     No past medical history on file.  None, no regular medications   History reviewed. No pertinent surgical history.      Current Facility-Administered Medications:   •  LR infusion, , Intravenous, Continuous, Chanell Chaidez, D.O.  •  fentaNYL (SUBLIMAZE) injection 50 mcg, 50 mcg, Intravenous, Q HOUR PRN, Chanell Chaidez, D.O.  •  fentaNYL (SUBLIMAZE) injection 100 mcg, 100 mcg, Intravenous, Q HOUR PRN, Chanell Chaidez, D.O.  •  oxytocin (PITOCIN) infusion (for induction), 0.5-20 bobo-units/min, Intravenous, Continuous, Chanell Chaidez, D.O.  •  miSOPROStol (CYTOTEC) tablet 800 mcg, 800 mcg, Rectal, Once PRN, Chanell Chaidez, D.O.    Allergies: Patient has no known allergies.    Social History     Socioeconomic History   • Marital status:      Spouse name: Not on file   • Number of children: Not on file   • Years of education: Not on file   • Highest education level: Not on file   Occupational History   • Not on file   Social Needs   • Financial resource strain: Not on file   • Food insecurity     Worry:  "Patient refused     Inability: Patient refused   • Transportation needs     Medical: Patient refused     Non-medical: Patient refused   Tobacco Use   • Smoking status: Never Smoker   • Smokeless tobacco: Never Used   Substance and Sexual Activity   • Alcohol use: Not Currently     Comment: first time today   • Drug use: No   • Sexual activity: Yes     Partners: Male     Comment: Planned pregancy, . FOB involved and supportive.   Lifestyle   • Physical activity     Days per week: Not on file     Minutes per session: Not on file   • Stress: Not on file   Relationships   • Social connections     Talks on phone: Not on file     Gets together: Not on file     Attends Yazidi service: Not on file     Active member of club or organization: Not on file     Attends meetings of clubs or organizations: Not on file     Relationship status: Not on file   • Intimate partner violence     Fear of current or ex partner: Not on file     Emotionally abused: Not on file     Physically abused: Not on file     Forced sexual activity: Not on file   Other Topics Concern   • Not on file   Social History Narrative   • Not on file     No smoking, alcohol, no illicit drugs     FamHx:   Congenital anomalies denies   Chromosomal disorders denies   Inherited MR denies   Blood dyscrasias denies     Prenatal care with following problems:  Patient Active Problem List    Diagnosis Date Noted   • Labor and delivery, indication for care 05/21/2017   • Labor and delivery indication for care or intervention 01/09/2016         Objective:      /56   Pulse 92   Temp 36.5 °C (97.7 °F) (Oral)   Resp 18   Ht 1.6 m (5' 2.99\")   Wt 119.3 kg (263 lb)   SpO2 98%     General:   no acute distress   Skin:   normal   HEENT:  PERRLA   Lungs:   CTA bilateral   Heart:   S1, S2 normal, no murmur, click, rub or gallop, regular rate and rhythm, chest is clear without rales or wheezing, no pedal edema   Abdomen:   soft, gravid, NT   EFW:  3800g   Pelvis: "  adequate with gynecoid pelvis   FHT:  130 BPM  Accels present  Decels absent   Variability moderate   Category 1   Uterine Size: S=D   Presentations: Cephalic   Cervix:     Dilation: 3cm    Effacement: 50%    Station:  -2    Consistency: Soft    Position: Middle     Lab Review  Lab:   Blood type: O     Recent Results (from the past 5880 hour(s))   POCT Pregnancy    Collection Time: 04/28/20  9:05 AM   Result Value Ref Range    POC Urine Pregnancy Test Positive Negative    Internal Control Positive Positive     Internal Control Negative Negative    POCT Urinalysis    Collection Time: 05/06/20  2:41 PM   Result Value Ref Range    POC Color      POC Appearance      POC Leukocyte Esterase Small Negative    POC Nitrites Negative Negative    POC Urobiligen      POC Protein Negative Negative mg/dL    POC Urine PH 6.5 5.0 - 8.0    POC Blood Trace Negative    POC Specific Gravity 1.020 <1.005 - >1.030    POC Ketones Negative Negative mg/dL    POC Bilirubin      POC Glucose Negative Negative mg/dL   THINPREP RFLX HPV ASCUS W/CTNG    Collection Time: 05/06/20  3:05 PM   Result Value Ref Range    Cytology Reg See Path Report     C. trachomatis by PCR Negative Negative    N. gonorrhoeae by PCR Negative Negative    Source Cervical    PRENATAL PANEL 3+HIV+UACXI    Collection Time: 05/06/20  4:13 PM   Result Value Ref Range    Color Yellow     Character Clear     Specific Gravity 1.025 <1.035    Ph 6.0 5.0 - 8.0    Glucose Negative Negative mg/dL    Ketones Negative Negative mg/dL    Protein Negative Negative mg/dL    Bilirubin Negative Negative    Urobilinogen, Urine 1.0 Negative    Nitrite Negative Negative    Leukocyte Esterase Negative Negative    Occult Blood Trace (A) Negative    Micro Urine Req Microscopic     WBC 10.6 4.8 - 10.8 K/uL    RBC 4.54 4.20 - 5.40 M/uL    Hemoglobin 12.7 12.0 - 16.0 g/dL    Hematocrit 40.3 37.0 - 47.0 %    MCV 88.8 81.4 - 97.8 fL    MCH 28.0 27.0 - 33.0 pg    MCHC 31.5 (L) 33.6 - 35.0 g/dL    RDW  43.0 35.9 - 50.0 fL    Platelet Count 193 164 - 446 K/uL    MPV 11.5 9.0 - 12.9 fL    Neutrophils-Polys 73.30 (H) 44.00 - 72.00 %    Lymphocytes 18.80 (L) 22.00 - 41.00 %    Monocytes 5.80 0.00 - 13.40 %    Eosinophils 0.70 0.00 - 6.90 %    Basophils 0.30 0.00 - 1.80 %    Immature Granulocytes 1.10 (H) 0.00 - 0.90 %    Nucleated RBC 0.00 /100 WBC    Neutrophils (Absolute) 7.78 (H) 2.00 - 7.15 K/uL    Lymphs (Absolute) 1.99 1.00 - 4.80 K/uL    Monos (Absolute) 0.62 0.00 - 0.85 K/uL    Eos (Absolute) 0.07 0.00 - 0.51 K/uL    Baso (Absolute) 0.03 0.00 - 0.12 K/uL    Immature Granulocytes (abs) 0.12 (H) 0.00 - 0.11 K/uL    NRBC (Absolute) 0.00 K/uL    Rubella IgG Antibody 307.00 IU/mL    Hepatitis B Surface Antigen Non-Reactive Non-Reactive    Syphilis, Treponemal Qual Non-Reactive Non-Reactive   HIV AG/AB COMBO ASSAY SCREENING    Collection Time: 05/06/20  4:13 PM   Result Value Ref Range    HIV Ag/Ab Combo Assay Non-Reactive Non Reactive   OP Prenatal Panel-Blood Bank    Collection Time: 05/06/20  4:13 PM   Result Value Ref Range    ABO Grouping Only O     Rh Grouping Only POS     Antibody Screen Scrn NEG    URINE MICROSCOPIC (W/UA)    Collection Time: 05/06/20  4:13 PM   Result Value Ref Range    WBC Rare /hpf    RBC 2-5 (A) /hpf    Bacteria Few (A) None /hpf    Mucous Threads Moderate /hpf   AMNISURE ROM ASSAY    Collection Time: 09/05/20  1:00 AM   Result Value Ref Range    AmniSure ROM Negative Negative   GRP B STREP, BY PCR (JARA BROTH)    Collection Time: 09/05/20  1:30 AM    Specimen: Vaginal; Genital   Result Value Ref Range    Strep Gp B DNA PCR Negative Negative   COVID/SARS CoV-2 PCR    Collection Time: 09/11/20  2:22 AM    Specimen: Nasopharyngeal; Respirate   Result Value Ref Range    COVID Order Status Received    SARS-CoV-2, PCR (In-House)    Collection Time: 09/11/20  2:22 AM   Result Value Ref Range    SARS-CoV-2 Source NP Swab     SARS-CoV-2 by PCR NotDetected         Assessment:   Aleja CRUZ  Giovani Sanches at 41w0d  Labor status: Active labor     Aleja Head ANTHONY Sanches is a 25 y.o. female  at 41w0d who presents for elective IOL at term       Obstetrical history significant for   Patient Active Problem List    Diagnosis Date Noted   • Labor and delivery, indication for care 2017   • Labor and delivery indication for care or intervention 2016   .      Plan:     Admit to L&D  GBS negative  Fetal monitoring/toco  Start pitocin at 2, going up by 2 every 30 minutes     Jaspal Monroe MD   [facility]   PGY1 FM Resident

## 2020-09-12 NOTE — CARE PLAN
Problem: Pain  Goal: Alleviation of Pain or a reduction in pain to the patient's comfort goal  Outcome: PROGRESSING AS EXPECTED  Note: Pt plan for pain control is Fentanyl. Pt states this was her pain control with her last deliveries and would like that this time as well.     Problem: Risk for Infection, Impaired Wound Healing  Goal: Remain free from signs and symptoms of infection  Outcome: PROGRESSING AS EXPECTED  Note: Pt monitored for s/s of infection. None at this time.

## 2020-09-12 NOTE — PROGRESS NOTES
Pt is a ; EMMA of ; making her 41w. Pt here for her scheduled IOL for PD. EFM and TOCO applied. VSS. Admit profile completed, IV started, and labs drawn. Report given to Marely MIN.

## 2020-09-12 NOTE — PROGRESS NOTES
1300 - Assumed care of pt. Report from MER Arnett RN.  1415 - US done by Dr. Monroe, Vertex confirmed.   1553 - Dr. Chaidez at bedside. SVE: 4/75/-2. AROM clear fluid.   1652 - Pt feeling increased vaginal pressure. SVE: 6/80/-2.  1816 - Pt feeling increased pressure and urge to push. SVE: 8/80/-1. Pt requesting Fentanyl, Dr. Chaidez updated on SVE, RN to not give Fentanyl because pt is close to delivery.   1835 - Pt feeling urge to push. SVE: 8-9/90/-1. MD to bedside and remaining at bedside till del.  1842 - SVE by Dr. Chaidez. Ant/Lip.  1915 - Pt Complete.  1921 - Del of viable infant female. Apgars 8/9. Fundus firm, bleeding light.  2030 - Pt up to bathroom, able to void. Pt taken up to postpartum. Report given to Elzbieta MIN.

## 2020-09-13 LAB
ERYTHROCYTE [DISTWIDTH] IN BLOOD BY AUTOMATED COUNT: 41.9 FL (ref 35.9–50)
HCT VFR BLD AUTO: 40 % (ref 37–47)
HGB BLD-MCNC: 13 G/DL (ref 12–16)
MCH RBC QN AUTO: 27.3 PG (ref 27–33)
MCHC RBC AUTO-ENTMCNC: 32.5 G/DL (ref 33.6–35)
MCV RBC AUTO: 83.9 FL (ref 81.4–97.8)
PLATELET # BLD AUTO: 198 K/UL (ref 164–446)
PMV BLD AUTO: 10.6 FL (ref 9–12.9)
RBC # BLD AUTO: 4.77 M/UL (ref 4.2–5.4)
WBC # BLD AUTO: 16.1 K/UL (ref 4.8–10.8)

## 2020-09-13 PROCEDURE — 700102 HCHG RX REV CODE 250 W/ 637 OVERRIDE(OP): Performed by: OBSTETRICS & GYNECOLOGY

## 2020-09-13 PROCEDURE — 36415 COLL VENOUS BLD VENIPUNCTURE: CPT

## 2020-09-13 PROCEDURE — 770002 HCHG ROOM/CARE - OB PRIVATE (112)

## 2020-09-13 PROCEDURE — 90715 TDAP VACCINE 7 YRS/> IM: CPT

## 2020-09-13 PROCEDURE — A9270 NON-COVERED ITEM OR SERVICE: HCPCS | Performed by: OBSTETRICS & GYNECOLOGY

## 2020-09-13 PROCEDURE — 700111 HCHG RX REV CODE 636 W/ 250 OVERRIDE (IP)

## 2020-09-13 PROCEDURE — 90471 IMMUNIZATION ADMIN: CPT

## 2020-09-13 PROCEDURE — 85027 COMPLETE CBC AUTOMATED: CPT

## 2020-09-13 RX ADMIN — HYDROCODONE BITARTRATE AND ACETAMINOPHEN 1 TABLET: 5; 325 TABLET ORAL at 06:51

## 2020-09-13 RX ADMIN — IBUPROFEN 800 MG: 800 TABLET, FILM COATED ORAL at 19:02

## 2020-09-13 RX ADMIN — TETANUS TOXOID, REDUCED DIPHTHERIA TOXOID AND ACELLULAR PERTUSSIS VACCINE, ADSORBED 0.5 ML: 5; 2.5; 8; 8; 2.5 SUSPENSION INTRAMUSCULAR at 22:08

## 2020-09-13 RX ADMIN — HYDROCODONE BITARTRATE AND ACETAMINOPHEN 1 TABLET: 5; 325 TABLET ORAL at 19:01

## 2020-09-13 RX ADMIN — IBUPROFEN 800 MG: 800 TABLET, FILM COATED ORAL at 00:02

## 2020-09-13 RX ADMIN — HYDROCODONE BITARTRATE AND ACETAMINOPHEN 1 TABLET: 5; 325 TABLET ORAL at 00:02

## 2020-09-13 RX ADMIN — PRENATAL WITH FERROUS FUM AND FOLIC ACID 1 TABLET: 3080; 920; 120; 400; 22; 1.84; 3; 20; 10; 1; 12; 200; 27; 25; 2 TABLET ORAL at 08:52

## 2020-09-13 RX ADMIN — DOCUSATE SODIUM 100 MG: 100 CAPSULE, LIQUID FILLED ORAL at 19:01

## 2020-09-13 RX ADMIN — IBUPROFEN 800 MG: 800 TABLET, FILM COATED ORAL at 08:52

## 2020-09-13 RX ADMIN — DOCUSATE SODIUM 100 MG: 100 CAPSULE, LIQUID FILLED ORAL at 00:01

## 2020-09-13 NOTE — CARE PLAN
Problem: Altered physiologic condition related to immediate post-delivery state and potential for bleeding/hemorrhage  Goal: Patient physiologically stable as evidenced by normal lochia, palpable uterine involution and vital signs within normal limits  Outcome: PROGRESSING AS EXPECTED  Note: Fundus firm, lochia is light and rubra. No clots noticed. Vitals stable.      Problem: Potential for postpartum infection related to presence of episiotomy/vaginal tear and/or uterine contamination  Goal: Patient will be absent from signs and symptoms of infection  Outcome: PROGRESSING AS EXPECTED  Note: No s/s of infection present on assessment. Pt afebrile.

## 2020-09-13 NOTE — L&D DELIVERY NOTE
Delivery Note for Vaginal Delivery     Stage 1:  25 y.o. y/o  at 41w0d weeks admitted for induction labor for late term. Her labor progressed with pitocin and AROM.  The patient was noted to be GBS negative.  Fetal monitoring during labor was overall category I.  Patient had nothing for pain control.     Stage II: At 1915, she was noted to be complete.  She then pushed twice to deliver a  viable, vigorous female infant on 20 at 1921.  The delivery was uncomplicated.  There was a nuchal cord that was easily delivered through. Shoulders were delivered easily.  The infant was placed immediately upon mother’s abdomen.  Apgars at 1 and 5 minutes were 8/9 and the infant has not yet been weighed.    Stage III: Attention was then turned to active management of the third stage. The placenta was delivered spontaneously with gentle manual traction on the umbilical cord with countertraction maintained suprapubically.  On inspection, the placenta was intact and had normal insertion.  Upon delivery of the placenta, good hemostasis was noted with fundal massage and a 40 unit bolus of pitocin in IV infusion was administered per protocol.     Laceration repair: The perineum was inspected following delivery. The patient did not have any lacerations.      Estimated blood loss for the above procedures was 150cc.     Mother and infant in stable condition, and family pleased with birth.     Chanell Chaidez DO  [facility], Women's Health

## 2020-09-13 NOTE — PROGRESS NOTES
Post Partum Progress Note    Name:   Aleja Sanches   Date/Time:  2020 - 1:46 PM  Chief Admitting Dx:  Pregnancy  Indication for care in labor or delivery  Delivery Type:  vaginal, spontaneous  Post-Op/Post Partum Days #:  1    25  PPD1 s/p  at 41w0d     Subjective:  Abdominal pain: no  Ambulating:   yes  Tolerating liquids:  yes  Tolerating food:  yes common adult  Flatus:   yes  BM:    no  Bleeding:   with a small amount of bleeding  Voiding:   yes  Dizziness:   no  Feeding:   both breast and bottle - Similac with iron    Vitals:    20 2120 20 0200 20 0600   BP: 118/56 124/62 127/66 115/65   Pulse: 70 60 70 63   Resp:  18 18 16   Temp:  36.9 °C (98.5 °F) 36.2 °C (97.1 °F) 36.2 °C (97.1 °F)   TempSrc:  Temporal Temporal Temporal   SpO2:  95% 95% 95%   Weight:       Height:           Exam:  Abdomen: Abdomen soft, non-tender. BS normal. No masses,  No organomegaly  Fundal Tenderness:  yes  Fundus Firm: yes  Below umbilicus: yes  Perineum: perineum intact  Lochia: mild  Extremities: Normal extremities, peripheral pulses and reflexes normal, no peripheral edema     Meds:  Current Facility-Administered Medications   Medication Dose   • ondansetron (ZOFRAN ODT) dispertab 4 mg  4 mg    Or   • ondansetron (ZOFRAN) syringe/vial injection 4 mg  4 mg   • oxytocin (PITOCIN) infusion (for induction)  0.5-20 bobo-units/min   • oxytocin (PITOCIN) infusion (for postpartum)   mL/hr   • ibuprofen (MOTRIN) tablet 800 mg  800 mg   • HYDROcodone-acetaminophen (NORCO) 5-325 MG per tablet 1 Tab  1 Tab   • HYDROcodone/acetaminophen (NORCO)  MG per tablet 1 Tab  1 Tab   • LR infusion     • PRN oxytocin (PITOCIN) (20 Units/1000 mL) PRN for excessive uterine bleeding - See Admin Instr  125-999 mL/hr   • miSOPROStol (CYTOTEC) tablet 600 mcg  600 mcg   • methylergonovine (METHERGINE) injection 0.2 mg  0.2 mg   • carboPROST (HEMABATE) injection 250 mcg  250 mcg   •  docusate sodium (COLACE) capsule 100 mg  100 mg   • bisacodyl (DULCOLAX) suppository 10 mg  10 mg   • magnesium hydroxide (MILK OF MAGNESIA) suspension 30 mL  30 mL   • tetanus-dipth-acell pertussis (Tdap) inj 0.5 mL  0.5 mL   • measles, mumps and rubella vaccine (MMR) injection 0.5 mL  0.5 mL   • prenatal plus vitamin (STUARTNATAL 1+1) 27-1 MG tablet 1 Tab  1 Tab       Labs:   Recent Labs     09/12/20  1210 09/13/20  0517   WBC 9.6 16.1*   RBC 4.82 4.77   HEMOGLOBIN 12.8 13.0   HEMATOCRIT 41.3 40.0   MCV 85.7 83.9   MCH 26.6* 27.3   MCHC 31.0* 32.5*   RDW 43.6 41.9   PLATELETCT 207 198   MPV 11.6 10.6       Assessment:  Chief Admitting Dx:  Pregnancy  Indication for care in labor or delivery  Delivery Type:  vaginal, spontaneous  Tubal Ligation:  no    Plan:  Continue routine post partum care.  Patient looks great, but requested to stay an extra night since the pedatrician's are still monitoring baby.   Anticipate DC PPD2  Continue to encourage ambulation and oral intake   Lactation consultant PRN for breast feeding assistance     Jaspal Monroe M.D.

## 2020-09-13 NOTE — PROGRESS NOTES
"Report received from Elzbieta MIN. Assumed Patient care. Patient appears calm and in no acute distress. Labs and orders reviewed. Assessment completed. Fundus firm, light lochia noted. Patient states 5/10 lower abdomen;perineum pain, patient requesting PRN pain medication, patient medicated, refer to MAR. Patient educated to notify RN if pain is worsen, patient verbalizes understanding.Patient educated on keeping infant bundled up and/or skin-to-skin, safe sleep policy for infant, and infant feeding frequency, patient verbalizes understanding. Patient provided with scheduled medication, refer to MAR. Plan of care discussed with patient; questions have been answered at this time. Patient needs have been met at this time. Patient encouraged to call when needing assistance. Call light within reach. Rounding in place. /65   Pulse 63   Temp 36.2 °C (97.1 °F) (Temporal)   Resp 16   Ht 1.6 m (5' 2.99\")   Wt 119.3 kg (263 lb)   LMP 11/30/2019 (Exact Date)   SpO2 95%   Breastfeeding Yes   BMI 46.60 kg/m² .   "

## 2020-09-13 NOTE — LACTATION NOTE
This note was copied from a baby's chart.  Mother prefers to supplement her breast fed infant with formula, has done this with past babies. Education provided.

## 2020-09-13 NOTE — CARE PLAN
Problem: Altered physiologic condition related to immediate post-delivery state and potential for bleeding/hemorrhage  Goal: Patient physiologically stable as evidenced by normal lochia, palpable uterine involution and vital signs within normal limits  Outcome: PROGRESSING AS EXPECTED  Note: Fundus firm, light lochia noted. Patient able to ambulate and provide self perineal care. Monitoring and assessing patient vital signs.      Problem: Potential for postpartum infection related to presence of episiotomy/vaginal tear and/or uterine contamination  Goal: Patient will be absent from signs and symptoms of infection  Outcome: PROGRESSING AS EXPECTED  Note: Patient exhibits no s/s of infection. Using standard precaution while providing care to patient. Patient demonstrates effective hand washing.

## 2020-09-13 NOTE — PROGRESS NOTES
Patient admitted to unit to room 325 from L&D. Received report from Marely MIN. Assumed care of patient. Assessment complete. Fundus is firm, with light lochia rubra. Pain 4/10, patient verbalized that she will call for medication on an as needed basis. Patient and FOB oriented to room and procedures, emergency light, call bell, infant I&O sheet, infant sleep safety, identification badges, and to call for assistance to bathroom. ID bands verified with L&D RN, cuddles on an blinking.   Infants feeding plan discussed with parents. Per MOB, she wants to both breastfeed and bottle feed with similac. MOB stated that she only breast fed her other children for about 3 months due to infant's not latching and issues with milk supply. Skin to skin, hand expressing, and continuing to latch infant encouraged to help protect milk supply. MOB verbalized understanding. Infant has latched fine once on labor and delivery. No issues with the latch per MOB. The need for the glucose algorithm reviewed with MOB, no questions at this time.  Patient and FOB verbalized understanding, all questions addressed and answered. Bed is locked and in low position. Call light left within reach and encouraged to call for any needs if necessary.

## 2020-09-13 NOTE — DISCHARGE PLANNING
"Discharge Planning Assessment Post Partum    Reason for Referral: \"Limited prenatal care.\"    Address: [address] [place] [address] [place] [address]  Type of Living Situation: RADHA lives with FOB, other three children, and her aunts in a home.    Mom Diagnosis: Pregnancy  Baby Diagnosis: [place]  Primary Language: English    Name of Baby: Nery \"Jeffery\" Pea    Father of the Baby: Ryley Mcdonald  Involved in baby’s care? RADHA reports that CHRISTIE is supportive and is very involved. He was not present at bedside during assessment and MOB reports that he had just left to get some food.    Prenatal Care: RADHA reports that she received limited prenatal care. She states that she went to three prenatal care appointments (the first appointment in May and the last in . She reports that she did not feel comfortable going to the doctor due to the current pandemic, so she stopped going to her prenatal appointments.    Mom's PCP: RADHA does not have a PCP.    PCP for new baby: MOB plans on having baby go to the same pediatrician, Dr. Joshi, as her other children. She stated that her insurance will be changing soon and requested a pediatrician list incase Dr. Joshi does not accept her new insurance.     Support System: RADHA reports having a strong support system with family and friends.    Coping/Bonding between mother & baby: Baby was asleep in crib at bedside. MOB stated that they are bonding \"great.\" Bedside RN did not report any concerns with their bonding to this SW and reports that MOB has been appropriate with baby.    Source of Feeding: RADHA states she plans on breast feeding and supplementing with formula.    Supplies for Infant: RADHA states she has all needed supplies at home including a car seat, crib, clothing, and diapers.     Mom's Insurance: [employer], will be changing soon to [facility].  Baby Covered on Insurance: Same as MOB.    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 FOB works full time at "[employer].     Other " children in the home/names & ages: RADHA has a 5, 4 and 3 year old at home.    Financial Hardship/Income: RADHA denies having any financial concerns currently.     Mom's Mental status: RADHA was A&Ox4. She was pleasant, calm and cooperative throughout assessment.     Services used prior to admit: None reported.    CPS History: Denies.    Psychiatric History: Denies.    Domestic Violence History: Denies.    Drug/ETOH History: Denies any drug or alcohol use.    Resources Provided: Provided MOB with a pediatrician list, and a family resource list.    Referrals Made: Staffed case with CPS , Sarah, without providing any identifying information to determine if an informational report needs to be made due to limited prenatal care. Sarah staffed with her supervisor who stated that a CPS report does NOT needs to be made.     Clearance for Discharge: Yes, baby is cleared to d/c home with parents from SS once medically cleared.     Ongoing Plan: SS will remain available to provide support and assist as needed.

## 2020-09-14 VITALS
HEART RATE: 72 BPM | WEIGHT: 263 LBS | TEMPERATURE: 97.6 F | HEIGHT: 63 IN | RESPIRATION RATE: 18 BRPM | SYSTOLIC BLOOD PRESSURE: 116 MMHG | OXYGEN SATURATION: 96 % | BODY MASS INDEX: 46.6 KG/M2 | DIASTOLIC BLOOD PRESSURE: 65 MMHG

## 2020-09-14 PROCEDURE — RXMED WILLOW AMBULATORY MEDICATION CHARGE: Performed by: FAMILY MEDICINE

## 2020-09-14 PROCEDURE — A9270 NON-COVERED ITEM OR SERVICE: HCPCS | Performed by: OBSTETRICS & GYNECOLOGY

## 2020-09-14 PROCEDURE — 700102 HCHG RX REV CODE 250 W/ 637 OVERRIDE(OP): Performed by: OBSTETRICS & GYNECOLOGY

## 2020-09-14 RX ORDER — ACETAMINOPHEN AND CODEINE PHOSPHATE 120; 12 MG/5ML; MG/5ML
1 SOLUTION ORAL DAILY
Qty: 84 TAB | Refills: 4 | Status: ON HOLD | OUTPATIENT
Start: 2020-09-14 | End: 2022-08-07

## 2020-09-14 RX ORDER — IBUPROFEN 800 MG/1
800 TABLET ORAL EVERY 8 HOURS PRN
Qty: 30 TAB | Refills: 0 | Status: ON HOLD | OUTPATIENT
Start: 2020-09-14 | End: 2022-08-07

## 2020-09-14 RX ADMIN — PRENATAL WITH FERROUS FUM AND FOLIC ACID 1 TABLET: 3080; 920; 120; 400; 22; 1.84; 3; 20; 10; 1; 12; 200; 27; 25; 2 TABLET ORAL at 07:58

## 2020-09-14 RX ADMIN — HYDROCODONE BITARTRATE AND ACETAMINOPHEN 1 TABLET: 5; 325 TABLET ORAL at 02:16

## 2020-09-14 RX ADMIN — DOCUSATE SODIUM 100 MG: 100 CAPSULE, LIQUID FILLED ORAL at 07:59

## 2020-09-14 RX ADMIN — IBUPROFEN 800 MG: 800 TABLET, FILM COATED ORAL at 04:27

## 2020-09-14 RX ADMIN — HYDROCODONE BITARTRATE AND ACETAMINOPHEN 1 TABLET: 5; 325 TABLET ORAL at 07:59

## 2020-09-14 NOTE — DISCHARGE SUMMARY
NORMAL SPONTANEOUS VAGINAL DISCHARGE SUMMARY    PATIENT ID:  NAME:  Aleja Sanches  MRN:               [id number]  YOB: 1995    DATE OF ADMISSION: 2020    DATE OF DISCHARGE: 2020     ADMITTING DIAGNOSIS: Intrauterine pregnancy at 41w0d.    DISCHARGE DIAGNOSIS: s/p     HOSPITAL COURSE: This is a 25 y.o. year old female admitted at  at 41w0d who presented with mild contractions, no LOF, minimal vaginal bleeding, normal FM and in latent labor. Pt was 3 cm dilated, 50% effaced and at  -2 station on sterile vaginal exam. Pregnancy was complicated by limited prenatal care. The patient had a good labor pattern after admission and proceeded to deliver a viable female infant weighing  7 lbs and 13.8 oz. Infants Apgars scores were 8 and 9 at one and five minutes. The patients postpartum course was uncomplicated and she was discharged home in stable condition on postpartum day #2.    PROCEDURES PERFORMED: Normal spontaneous vaginal delivery.    COMPLICATIONS: None    DIET: Regular    ACTIVITY: No intercourse and nothing inserted into the vagina for 6 weeks.    MEDICATIONS:  Current Outpatient Medications   Medication Sig Dispense Refill   • ibuprofen (MOTRIN) 800 MG Tab Take 1 Tab by mouth every 8 hours as needed (For cramping after delivery; do not give if patient is receiving ketorolac (Toradol)). 30 Tab 0   • norethindrone (MICRONOR) 0.35 MG tablet Take 1 Tab by mouth every day. 84 Tab 4         FOLLOWUP:  [facility]) [facility]  2) Return to the hospital if copious vaginal bleeding or foul smelling discharge is noted    Slade Brannon M.D.

## 2020-09-14 NOTE — LACTATION NOTE
This note was copied from a baby's chart.  Baby 41 weeks, mixed feedings per MOB choice, couplet to be discharged today. Mother has been mostly breastfeeding, reports she has lost of colostrum and baby is latching easily, latch not seen.     Teaching on hunger cues, breastfeeding when baby shows cues or by 4 hours from last feed, importance of skin to skin & cluster feeding.     MOB has [facility] breastfeeding card & contact #'s for OP lactation support.    Breastfeeding plan:  Breastfeed on cue a minimum 8x/24 hours or by 4 hours from last feed.

## 2020-09-14 NOTE — PROGRESS NOTES
0700: Assumed care of patient. Patient resting comfortably, no needs at this time.    0800: Assessment complete. Reviewed discharge process for the day including orders, paperwork, and testing for infant.    1210: Discharge paperwork reviewed and signed with D/c MECHELLE Barrios. No questions at this time.    1240: Patient discharged in stable condition off the unit with family and all belongings.

## 2020-09-14 NOTE — CARE PLAN
Problem: Altered physiologic condition related to immediate post-delivery state and potential for bleeding/hemorrhage  Goal: Patient physiologically stable as evidenced by normal lochia, palpable uterine involution and vital signs within normal limits  Outcome: PROGRESSING AS EXPECTED  Note: Fundus firm, lochia is light and rubra. Pt stated she has passed some small clots a few times today. Vitals stable.      Problem: Alteration in comfort related to episiotomy, vaginal repair and/or after birth pains  Goal: Patient is able to ambulate, care for self and infant  Outcome: PROGRESSING AS EXPECTED  Note: Pt ambulating without difficulty. Gait steady. No dizziness or lightheadedness.

## 2020-09-14 NOTE — PROGRESS NOTES
Discharge education and follow up information for infant and patient discussed with patient. Prescriptions given. Reinforced importance of  screen. Patient verbalizes understanding. Infant and patient bands matched. Cuddles removed.

## 2020-09-14 NOTE — DISCHARGE INSTRUCTIONS
POSTPARTUM DISCHARGE INSTRUCTIONS FOR MOM    YOB: 1995   Age: 25 y.o.               Admit Date: 9/12/2020     Discharge Date: 9/14/2020  Attending Doctor:  Chanell Chaidez, *                  Allergies:  Patient has no known allergies.    Discharged to home by car. Discharged via wheelchair, hospital escort: Yes.  Special equipment needed: Not Applicable  Belongings with: Personal  Be sure to schedule a follow-up appointment with your primary care doctor or any specialists as instructed.     Discharge Plan:   Diet Plan: Discussed  Activity Level: Discussed  Confirmed Follow up Appointment: Patient to Call and Schedule Appointment  Confirmed Symptoms Management: Discussed  Medication Reconciliation Updated: Yes    REASONS TO CALL YOUR OBSTETRICIAN:  1.   Persistent fever or shaking chills (Temperature higher than 100.4)  2.   Heavy bleeding (soaking more than 1 pad per hour); Passing clots  3.   Foul odor from vagina  4.   Mastitis (Breast infection; breast pain, chills, fever, redness)  5.   Urinary pain, burning or frequency  6.   Severe depression longer than 24 hours    HAND WASHING  · Prior to handling the baby.  · Before breastfeeding or bottle feeding baby.  · After using the bathroom or changing the baby's diaper.    VAGINAL CARE  · Nothing inside vagina for 6 weeks: no sexual intercourse, tampons or douching.  · Bleeding may continue for 2-4 weeks.  Amount may vary.    · Call your physician for heavy bleeding which means soaking more than 1 pad per hour    BIRTH CONTROL  · It is possible to become pregnant at any time after delivery and while breastfeeding.  · Plan to discuss a method of birth control with your physician at your follow up visit. visit.    DIET AND ELIMINATION  · Eating more fiber (bran cereal, fruits, and vegetables) and drinking plenty of fluids will help to avoid constipation.  · Urinary frequency after childbirth is normal.    POSTPARTUM BLUES  During the first few days  "after birth, you may experience a sense of the \"blues\" which may include impatience, irritability or even crying.  These feeling come and go quickly.  However, as many as 1 in 10 women experience emotional symptoms known as postpartum depression.    Postpartum depression:  May start as early as the second or third day after delivery or take several weeks or months to develop.  Symptoms of \"blues\" are present, but are more intense:  Crying spells; loss of appetite; feelings of hopelessness or loss of control; fear of touching the baby; over concern or no concern at all about the baby; little or no concern about your own appearance/caring for yourself; and/or inability to sleep or excessive sleeping.  Contact your physician if you are experiencing any of these symptoms.    Crisis Hotline:  · [place] Crisis Hotline:  5-962-WYIHUUC  Or 1-839.815.3092  · [place] Crisis Hotline:  1-819.970.6975  Or 373-572-6672    PREVENTING SHAKEN BABY:  If you are angry or stressed, PUT THE BABY IN THE CRIB, step away, take some deep breaths, and wait until you are calm to care for the baby.  DO NOT SHAKE THE BABY.  You are not alone, call a supporter for help.    · Crisis Call Center 24/7 crisis line 425-443-1725 or 1-670.113.9517  · You can also text them, text \"ANSWER\" to 023067    QUIT SMOKING/TOBACCO USE:  I understand the use of any tobacco products increases my chance of suffering from future heart disease and could cause other illnesses which may shorten my life. Quitting the use of tobacco products is the single most important thing I can do to improve my health. For further information on smoking / tobacco cessation call a Toll Free Quit Line at 1-955.249.2204 (*National [facility] [place]) or 1-368.409.7778 (American Lung Association) or you can access the web based program at www.lungusa.org.    · [place] Tobacco Users Help Line:  (626) 727-2921       Toll Free: 1-939.758.6709  · Quit Tobacco Program [facility] " Services (199)786-1298    DEPRESSION / SUICIDE RISK:  As you are discharged from this [facility] facility, it is important to learn how to keep safe from harming yourself.    Recognize the warning signs:  · Abrupt changes in personality, positive or negative- including increase in energy   · Giving away possessions  · Change in eating patterns- significant weight changes-  positive or negative  · Change in sleeping patterns- unable to sleep or sleeping all the time   · Unwillingness or inability to communicate  · Depression  · Unusual sadness, discouragement and loneliness  · Talk of wanting to die  · Neglect of personal appearance   · Rebelliousness- reckless behavior  · Withdrawal from people/activities they love  · Confusion- inability to concentrate     If you or a loved one observes any of these behaviors or has concerns about self-harm, here's what you can do:  · Talk about it- your feelings and reasons for harming yourself  · Remove any means that you might use to hurt yourself (examples: pills, rope, extension cords, firearm)  · Get professional help from the community (Mental Health, Substance Abuse, psychological counseling)  · Do not be alone:Call your Safe Contact- someone whom you trust who will be there for you.  · Call your local CRISIS HOTLINE 073-0606 or 537-449-3438  · Call your local Children's Mobile Crisis Response Team [place] (270) 144-4582 or www.[place]  · Call the toll free National Suicide Prevention Hotlines   · National Suicide Prevention Lifeline 529-659-ZKVJ (6505)  · National Hope Line Network 800-SUICIDE (201-7734)    DISCHARGE SURVEY:  Thank you for choosing [facility].  We hope we provided you with very good care.  You may be receiving a survey in the mail.  Please fill it out.  Your opinion is valuable to us.        My signature on this form indicates that:  1.  I have reviewed and understand the above information  2.  My questions regarding this information  have been answered to my satisfaction.  3.  I have formulated a plan with my discharge nurse to obtain my prescribed medication for home.

## 2020-09-14 NOTE — DISCHARGE PLANNING
Medication reconcilliation completed. Medications delivered to patient at bedside. Patient counseled.       Aleja Ochoa   Home Medication Instructions DINA:[id number]    Printed on:09/14/20 1204   Medication Information                      ibuprofen (MOTRIN) 800 MG Tab  Take 1 Tab by mouth every 8 hours as needed (For cramping after delivery).             norethindrone (MICRONOR) 0.35 MG tablet  Take 1 Tab by mouth every day.

## 2020-09-14 NOTE — PROGRESS NOTES
Both parents given tdap vaccines. See MAR for MOB. Consents signed and placed in corresponding areas.

## 2020-09-14 NOTE — PROGRESS NOTES
Assumed care of patient. Assessment complete. Fundus is firm, with scant to light lochia rubra. Pain level is 5/10, medicated per MAR. Bed is locked and in low position. Call light left within reach and encouraged to call for any needs if necessary.

## 2020-09-15 ENCOUNTER — PHARMACY VISIT (OUTPATIENT)
Dept: PHARMACY | Facility: MEDICAL CENTER | Age: 25
End: 2020-09-15
Payer: COMMERCIAL

## 2020-09-23 ENCOUNTER — TELEPHONE (OUTPATIENT)
Dept: OBGYN | Facility: CLINIC | Age: 25
End: 2020-09-23

## 2020-09-23 NOTE — TELEPHONE ENCOUNTER
Rosa from [facility] Birth Certificate called requesting prenatal flowsheet. Informed Rosa pt had only 2 visits, DUB and New Ob and dates given. Per Rosa, that is all the information she needed

## 2021-05-18 NOTE — PROGRESS NOTES
"1820 - Patient of [facility] presents with c/o contractions. Becerra Gestation 40.2    Reports contractions off/on for the past two days, increasing intensity today.  Denies problems with Pregnancy - late PNC. Denies ROM or Bleeding - increase in vaginal discharge and spotting 2 days ago after SVE in L&D.    Denies change to vision/edema/HA, Reports FM. FM/TOCO use discussed and placed, FOB \"Joe\" at BS. POC discussed.     SVE  1+/50/-2 no blood or fluid noted on the exam glove.     Dry erase board updated with RN contact information; reviewed.   Patient encouraged to call RN with all questions concerns needs prn.    1835 - Report to Dr. Brannon regarding patient arrival/complaint/status. Order received to discharge pending reactive NST and labor status. POC discussed with patient/FOB.   "
2 seconds or less

## 2022-07-29 ENCOUNTER — APPOINTMENT (OUTPATIENT)
Dept: RADIOLOGY | Facility: MEDICAL CENTER | Age: 27
End: 2022-07-29
Attending: NURSE PRACTITIONER

## 2022-07-29 ENCOUNTER — HOSPITAL ENCOUNTER (EMERGENCY)
Facility: MEDICAL CENTER | Age: 27
End: 2022-07-29
Attending: OBSTETRICS & GYNECOLOGY | Admitting: OBSTETRICS & GYNECOLOGY

## 2022-07-29 VITALS
WEIGHT: 245 LBS | SYSTOLIC BLOOD PRESSURE: 120 MMHG | HEIGHT: 63 IN | DIASTOLIC BLOOD PRESSURE: 63 MMHG | HEART RATE: 77 BPM | BODY MASS INDEX: 43.41 KG/M2 | TEMPERATURE: 97.6 F | RESPIRATION RATE: 18 BRPM

## 2022-07-29 LAB
A1 MICROGLOB PLACENTAL VAG QL: NEGATIVE
ABO GROUP BLD: NORMAL
BASOPHILS # BLD AUTO: 0.3 % (ref 0–1.8)
BASOPHILS # BLD: 0.03 K/UL (ref 0–0.12)
BLD GP AB SCN SERPL QL: NORMAL
EOSINOPHIL # BLD AUTO: 0.06 K/UL (ref 0–0.51)
EOSINOPHIL NFR BLD: 0.6 % (ref 0–6.9)
ERYTHROCYTE [DISTWIDTH] IN BLOOD BY AUTOMATED COUNT: 41.4 FL (ref 35.9–50)
GP B STREP DNA SPEC QL NAA+PROBE: POSITIVE
HBV SURFACE AG SER QL: ABNORMAL
HCT VFR BLD AUTO: 38.4 % (ref 37–47)
HCV AB SER QL: ABNORMAL
HGB BLD-MCNC: 12.4 G/DL (ref 12–16)
HIV 1+2 AB+HIV1 P24 AG SERPL QL IA: NORMAL
IMM GRANULOCYTES # BLD AUTO: 0.13 K/UL (ref 0–0.11)
IMM GRANULOCYTES NFR BLD AUTO: 1.3 % (ref 0–0.9)
LYMPHOCYTES # BLD AUTO: 2.36 K/UL (ref 1–4.8)
LYMPHOCYTES NFR BLD: 23.8 % (ref 22–41)
MCH RBC QN AUTO: 26.1 PG (ref 27–33)
MCHC RBC AUTO-ENTMCNC: 32.3 G/DL (ref 33.6–35)
MCV RBC AUTO: 80.7 FL (ref 81.4–97.8)
MONOCYTES # BLD AUTO: 0.67 K/UL (ref 0–0.85)
MONOCYTES NFR BLD AUTO: 6.8 % (ref 0–13.4)
NEUTROPHILS # BLD AUTO: 6.65 K/UL (ref 2–7.15)
NEUTROPHILS NFR BLD: 67.2 % (ref 44–72)
NRBC # BLD AUTO: 0 K/UL
NRBC BLD-RTO: 0 /100 WBC
PLATELET # BLD AUTO: 231 K/UL (ref 164–446)
PMV BLD AUTO: 11 FL (ref 9–12.9)
RBC # BLD AUTO: 4.76 M/UL (ref 4.2–5.4)
RH BLD: NORMAL
RUBV AB SER QL: 203 IU/ML
T PALLIDUM AB SER QL IA: ABNORMAL
WBC # BLD AUTO: 9.9 K/UL (ref 4.8–10.8)

## 2022-07-29 PROCEDURE — 87389 HIV-1 AG W/HIV-1&-2 AB AG IA: CPT

## 2022-07-29 PROCEDURE — 85025 COMPLETE CBC W/AUTO DIFF WBC: CPT

## 2022-07-29 PROCEDURE — 59025 FETAL NON-STRESS TEST: CPT

## 2022-07-29 PROCEDURE — 86592 SYPHILIS TEST NON-TREP QUAL: CPT

## 2022-07-29 PROCEDURE — 86901 BLOOD TYPING SEROLOGIC RH(D): CPT

## 2022-07-29 PROCEDURE — 86850 RBC ANTIBODY SCREEN: CPT

## 2022-07-29 PROCEDURE — 86762 RUBELLA ANTIBODY: CPT

## 2022-07-29 PROCEDURE — 302449 STATCHG TRIAGE ONLY (STATISTIC)

## 2022-07-29 PROCEDURE — 87653 STREP B DNA AMP PROBE: CPT

## 2022-07-29 PROCEDURE — 86780 TREPONEMA PALLIDUM: CPT

## 2022-07-29 PROCEDURE — 86803 HEPATITIS C AB TEST: CPT

## 2022-07-29 PROCEDURE — 76816 OB US FOLLOW-UP PER FETUS: CPT

## 2022-07-29 PROCEDURE — 36415 COLL VENOUS BLD VENIPUNCTURE: CPT

## 2022-07-29 PROCEDURE — 87340 HEPATITIS B SURFACE AG IA: CPT

## 2022-07-29 PROCEDURE — 84112 EVAL AMNIOTIC FLUID PROTEIN: CPT

## 2022-07-29 PROCEDURE — 86900 BLOOD TYPING SEROLOGIC ABO: CPT

## 2022-07-29 NOTE — ED PROVIDER NOTES
"S: Pt is a 27 y.o.  at 39w3d with Estimated Date of Delivery: 22 who presents to triage c/o contractions x 1 hour and small gush of fluid.  She denies VB or further gush of fluid and reports contractions stopped since arrival Reports normal FM.    She has had no PNC.    Her EMMA is based on LMP which she is sure of.      O: /63   Pulse 77   Temp 36.4 °C (97.6 °F) (Temporal)   Resp 18   Ht 1.6 m (5' 3\")   Wt 111 kg (245 lb)   LMP 10/26/2021   BMI 43.40 kg/m²          FHTs: baseline 140, + accels, no decels, moderate variability       [place]: no UCs       SVE: negative amnisure, negative pooling per RN         A/P  Patient Active Problem List    Diagnosis Date Noted   • Labor and delivery, indication for care 2017   • Labor and delivery indication for care or intervention 2016       1.  IUP @ 39w3d  2.  Reactive NST.  3.  US to confirm dating and placental location/ANJALI  4.  GBS, prenatal labs  5.  [facility] discussed    Ijeoma Ramos CNM, POLLY      "

## 2022-07-29 NOTE — PROGRESS NOTES
0203: Pt present to unit c/o a gush at 0100. Pt states she has no prenatal care d/t insurance issues. Dates from LMP of 10/26/21. Pt states GA of 39.3. States . Denies vag bleeding, states the leaking and ctx have stopped. Active FM. POC discussed. Pt v/u    0215: Ijeoma CNM called and SBAR given. Amnisure ordered    0250: Midwife informed of negative amnisure. Orders placed for prenatal panel, GBS    0310 Ijeoma PACHECOM at bedside. Orders received to discharge pt after labs and US    0315: US at bedside    0328: Lab at bedside    0359: Pt given discharge instructions, signed and v/u. All questions answered. Pt ambulated off unit in stable condition accompanied by S.O

## 2022-07-31 PROBLEM — B95.1 GROUP BETA STREP POSITIVE: Status: ACTIVE | Noted: 2022-07-31

## 2022-08-06 ENCOUNTER — HOSPITAL ENCOUNTER (INPATIENT)
Facility: MEDICAL CENTER | Age: 27
LOS: 2 days | End: 2022-08-08
Attending: OBSTETRICS & GYNECOLOGY | Admitting: OBSTETRICS & GYNECOLOGY

## 2022-08-06 DIAGNOSIS — Z37.9 NORMAL LABOR: ICD-10-CM

## 2022-08-06 LAB
BASOPHILS # BLD AUTO: 0.2 % (ref 0–1.8)
BASOPHILS # BLD: 0.03 K/UL (ref 0–0.12)
EOSINOPHIL # BLD AUTO: 0.04 K/UL (ref 0–0.51)
EOSINOPHIL NFR BLD: 0.3 % (ref 0–6.9)
ERYTHROCYTE [DISTWIDTH] IN BLOOD BY AUTOMATED COUNT: 42.3 FL (ref 35.9–50)
GLUCOSE BLD STRIP.AUTO-MCNC: 100 MG/DL (ref 65–99)
GLUCOSE BLD STRIP.AUTO-MCNC: 141 MG/DL (ref 65–99)
GLUCOSE BLD STRIP.AUTO-MCNC: 82 MG/DL (ref 65–99)
HCT VFR BLD AUTO: 40.3 % (ref 37–47)
HGB BLD-MCNC: 13.1 G/DL (ref 12–16)
HOLDING TUBE BB 8507: NORMAL
IMM GRANULOCYTES # BLD AUTO: 0.18 K/UL (ref 0–0.11)
IMM GRANULOCYTES NFR BLD AUTO: 1.3 % (ref 0–0.9)
LYMPHOCYTES # BLD AUTO: 1.83 K/UL (ref 1–4.8)
LYMPHOCYTES NFR BLD: 13.7 % (ref 22–41)
MCH RBC QN AUTO: 26.4 PG (ref 27–33)
MCHC RBC AUTO-ENTMCNC: 32.5 G/DL (ref 33.6–35)
MCV RBC AUTO: 81.1 FL (ref 81.4–97.8)
MONOCYTES # BLD AUTO: 0.54 K/UL (ref 0–0.85)
MONOCYTES NFR BLD AUTO: 4 % (ref 0–13.4)
NEUTROPHILS # BLD AUTO: 10.74 K/UL (ref 2–7.15)
NEUTROPHILS NFR BLD: 80.5 % (ref 44–72)
NRBC # BLD AUTO: 0 K/UL
NRBC BLD-RTO: 0 /100 WBC
PLATELET # BLD AUTO: 219 K/UL (ref 164–446)
PMV BLD AUTO: 10.6 FL (ref 9–12.9)
RBC # BLD AUTO: 4.97 M/UL (ref 4.2–5.4)
WBC # BLD AUTO: 13.4 K/UL (ref 4.8–10.8)

## 2022-08-06 PROCEDURE — 59409 OBSTETRICAL CARE: CPT

## 2022-08-06 PROCEDURE — 304965 HCHG RECOVERY SERVICES

## 2022-08-06 PROCEDURE — A9270 NON-COVERED ITEM OR SERVICE: HCPCS | Performed by: ADVANCED PRACTICE MIDWIFE

## 2022-08-06 PROCEDURE — 85025 COMPLETE CBC W/AUTO DIFF WBC: CPT

## 2022-08-06 PROCEDURE — 59409 OBSTETRICAL CARE: CPT | Performed by: ADVANCED PRACTICE MIDWIFE

## 2022-08-06 PROCEDURE — 700105 HCHG RX REV CODE 258: Performed by: OBSTETRICS & GYNECOLOGY

## 2022-08-06 PROCEDURE — 770002 HCHG ROOM/CARE - OB PRIVATE (112)

## 2022-08-06 PROCEDURE — 99282 EMERGENCY DEPT VISIT SF MDM: CPT

## 2022-08-06 PROCEDURE — 36415 COLL VENOUS BLD VENIPUNCTURE: CPT

## 2022-08-06 PROCEDURE — 700111 HCHG RX REV CODE 636 W/ 250 OVERRIDE (IP): Performed by: OBSTETRICS & GYNECOLOGY

## 2022-08-06 PROCEDURE — 3E033VJ INTRODUCTION OF OTHER HORMONE INTO PERIPHERAL VEIN, PERCUTANEOUS APPROACH: ICD-10-PCS | Performed by: OBSTETRICS & GYNECOLOGY

## 2022-08-06 PROCEDURE — 700102 HCHG RX REV CODE 250 W/ 637 OVERRIDE(OP): Performed by: ADVANCED PRACTICE MIDWIFE

## 2022-08-06 PROCEDURE — 82962 GLUCOSE BLOOD TEST: CPT | Mod: 91

## 2022-08-06 RX ORDER — IBUPROFEN 800 MG/1
800 TABLET ORAL
Status: DISCONTINUED | OUTPATIENT
Start: 2022-08-06 | End: 2022-08-06

## 2022-08-06 RX ORDER — ACETAMINOPHEN 500 MG
1000 TABLET ORAL
Status: DISCONTINUED | OUTPATIENT
Start: 2022-08-06 | End: 2022-08-06

## 2022-08-06 RX ORDER — SODIUM CHLORIDE, SODIUM LACTATE, POTASSIUM CHLORIDE, CALCIUM CHLORIDE 600; 310; 30; 20 MG/100ML; MG/100ML; MG/100ML; MG/100ML
INJECTION, SOLUTION INTRAVENOUS CONTINUOUS
Status: DISCONTINUED | OUTPATIENT
Start: 2022-08-06 | End: 2022-08-08 | Stop reason: HOSPADM

## 2022-08-06 RX ORDER — TERBUTALINE SULFATE 1 MG/ML
0.25 INJECTION, SOLUTION SUBCUTANEOUS
Status: DISCONTINUED | OUTPATIENT
Start: 2022-08-06 | End: 2022-08-06 | Stop reason: HOSPADM

## 2022-08-06 RX ORDER — SODIUM CHLORIDE, SODIUM LACTATE, POTASSIUM CHLORIDE, CALCIUM CHLORIDE 600; 310; 30; 20 MG/100ML; MG/100ML; MG/100ML; MG/100ML
INJECTION, SOLUTION INTRAVENOUS PRN
Status: DISCONTINUED | OUTPATIENT
Start: 2022-08-06 | End: 2022-08-08 | Stop reason: HOSPADM

## 2022-08-06 RX ORDER — MISOPROSTOL 200 UG/1
800 TABLET ORAL
Status: DISCONTINUED | OUTPATIENT
Start: 2022-08-06 | End: 2022-08-06 | Stop reason: HOSPADM

## 2022-08-06 RX ORDER — DOCUSATE SODIUM 100 MG/1
100 CAPSULE, LIQUID FILLED ORAL 2 TIMES DAILY PRN
Status: DISCONTINUED | OUTPATIENT
Start: 2022-08-06 | End: 2022-08-08 | Stop reason: HOSPADM

## 2022-08-06 RX ORDER — OXYTOCIN 10 [USP'U]/ML
10 INJECTION, SOLUTION INTRAMUSCULAR; INTRAVENOUS
Status: DISCONTINUED | OUTPATIENT
Start: 2022-08-06 | End: 2022-08-06 | Stop reason: HOSPADM

## 2022-08-06 RX ORDER — ACETAMINOPHEN 500 MG
1000 TABLET ORAL EVERY 6 HOURS PRN
Status: DISCONTINUED | OUTPATIENT
Start: 2022-08-06 | End: 2022-08-08 | Stop reason: HOSPADM

## 2022-08-06 RX ORDER — IBUPROFEN 800 MG/1
800 TABLET ORAL EVERY 8 HOURS PRN
Status: DISCONTINUED | OUTPATIENT
Start: 2022-08-06 | End: 2022-08-08 | Stop reason: HOSPADM

## 2022-08-06 RX ADMIN — FENTANYL CITRATE 100 MCG: 0.05 INJECTION, SOLUTION INTRAMUSCULAR; INTRAVENOUS at 18:22

## 2022-08-06 RX ADMIN — Medication 125 ML/HR: at 21:10

## 2022-08-06 RX ADMIN — Medication 2000 ML/HR: at 20:15

## 2022-08-06 RX ADMIN — SODIUM CHLORIDE 5 MILLION UNITS: 900 INJECTION INTRAVENOUS at 14:26

## 2022-08-06 RX ADMIN — SODIUM CHLORIDE 2.5 MILLION UNITS: 9 INJECTION, SOLUTION INTRAVENOUS at 17:17

## 2022-08-06 RX ADMIN — OXYTOCIN 2 MILLI-UNITS/MIN: 10 INJECTION, SOLUTION INTRAMUSCULAR; INTRAVENOUS at 17:17

## 2022-08-06 RX ADMIN — ACETAMINOPHEN 1000 MG: 500 TABLET ORAL at 21:38

## 2022-08-06 RX ADMIN — SODIUM CHLORIDE, POTASSIUM CHLORIDE, SODIUM LACTATE AND CALCIUM CHLORIDE: 600; 310; 30; 20 INJECTION, SOLUTION INTRAVENOUS at 14:28

## 2022-08-06 ASSESSMENT — LIFESTYLE VARIABLES
ALCOHOL_USE: NO
HOW MANY TIMES IN THE PAST YEAR HAVE YOU HAD 5 OR MORE DRINKS IN A DAY: 0
TOTAL SCORE: 0
EVER HAD A DRINK FIRST THING IN THE MORNING TO STEADY YOUR NERVES TO GET RID OF A HANGOVER: NO
CONSUMPTION TOTAL: NEGATIVE
HAVE YOU EVER FELT YOU SHOULD CUT DOWN ON YOUR DRINKING: NO
HAVE PEOPLE ANNOYED YOU BY CRITICIZING YOUR DRINKING: NO
EVER FELT BAD OR GUILTY ABOUT YOUR DRINKING: NO
ON A TYPICAL DAY WHEN YOU DRINK ALCOHOL HOW MANY DRINKS DO YOU HAVE: 0
TOTAL SCORE: 0
EVER_SMOKED: NEVER
TOTAL SCORE: 0
AVERAGE NUMBER OF DAYS PER WEEK YOU HAVE A DRINK CONTAINING ALCOHOL: 0

## 2022-08-06 ASSESSMENT — PATIENT HEALTH QUESTIONNAIRE - PHQ9
2. FEELING DOWN, DEPRESSED, IRRITABLE, OR HOPELESS: NOT AT ALL
1. LITTLE INTEREST OR PLEASURE IN DOING THINGS: NOT AT ALL
SUM OF ALL RESPONSES TO PHQ9 QUESTIONS 1 AND 2: 0

## 2022-08-06 ASSESSMENT — COPD QUESTIONNAIRES
DO YOU EVER COUGH UP ANY MUCUS OR PHLEGM?: NO/ONLY WITH OCCASIONAL COLDS OR INFECTIONS
DURING THE PAST 4 WEEKS HOW MUCH DID YOU FEEL SHORT OF BREATH: NONE/LITTLE OF THE TIME
IN THE PAST 12 MONTHS DO YOU DO LESS THAN YOU USED TO BECAUSE OF YOUR BREATHING PROBLEMS: DISAGREE/UNSURE
HAVE YOU SMOKED AT LEAST 100 CIGARETTES IN YOUR ENTIRE LIFE: NO/DON'T KNOW

## 2022-08-06 ASSESSMENT — PAIN DESCRIPTION - PAIN TYPE
TYPE: ACUTE PAIN
TYPE: ACUTE PAIN

## 2022-08-06 NOTE — ED PROVIDER NOTES
"S: Pt is a 27 y.o.  at 40w4d with Estimated Date of Delivery: 22 who presents to triage c/o contractions and leakage of fluid.    Reports pink-tinged fluid this morning in the shower.  No vaginal bleeding, normal fetal movement, and irregular contractions    O: /76   Pulse 91   Temp 36.3 °C (97.3 °F) (Temporal)   Ht 1.6 m (5' 3\")   Wt 113 kg (250 lb)   LMP 10/26/2021   SpO2 97%   BMI 44.29 kg/m²     SVE by nurse exam 5 cm         A/P  Patient Active Problem List    Diagnosis Date Noted   • Normal labor 2022   • Group beta Strep positive 2022   • Labor and delivery indication for care or intervention 2016       1.  IUP @ 40w4d in active labor.  Patient was admitted directly to labor and delivery and spent less than 5 minutes in triage    [facility] Caleb           "

## 2022-08-06 NOTE — H&P
History and Physical    Aleja Sanches is a 27 y.o. female  at 40w4d who presents for contractions and spontaneous leakage of fluid this morning in the shower.  Patient states it was pink-tinged but overall clear.  She has been kae since last night and currently states they are about every 6 to 8 minutes.  This pregnancy is complicated by lack of prenatal care.  She received 1 ultrasound in triage on 2022 confirming a viable IUP.  Her dates are based off of her LMP of which she is sure    Subjective:   CTXs: positive   Pain: positive  LOF: positive  Vaginal bleeding: negative   Fetal movement: positive    ROS: Pertinent positives documented in HPI and all other systems reviewed & are negative    OB History    Para Term  AB Living   6 4 4 0 1 4   SAB IAB Ectopic Molar Multiple Live Births   1       0 4      # Outcome Date GA Lbr [place]/2nd Weight Sex Delivery Anes PTL Lv   6 Current            5 Term 20 41w0d / 00:06 3.565 kg (7 lb 13.8 oz) F Vag-Spont None N TIMOTHY   4 Term  40w0d  4.132 kg (9 lb 1.8 oz) F Vag-Spont None     3 Term  40w0d  4.082 kg (9 lb) M Vag-Spont   TIMOTHY   2 Term  40w0d  3.799 kg (8 lb 6 oz) M Vag-Spont   TIMOTHY   1 SAB                PGYNHx:   Denies any history of STDs  Menses are regular monthly. Patient's last menstrual period was 10/26/2021.    No past medical history on file.    No past surgical history on file.      Current Facility-Administered Medications:   •  LR infusion, , Intravenous, Continuous, Yizhou Caleb, D.O.  •  terbutaline (BRETHINE) injection 0.25 mg, 0.25 mg, Subcutaneous, Once PRN, Sally Caleb, D.O.  •  oxytocin (PITOCIN) infusion (for post delivery), 2,000 mL/hr, Intravenous, Once **FOLLOWED BY** oxytocin (PITOCIN) infusion (for post delivery), 125 mL/hr, Intravenous, Continuous, Sally Caleb, D.O.  •  oxytocin (PITOCIN) injection 10 Units, 10 Units, Intramuscular, Once PRN, Sally Caleb, D.O.  •  ibuprofen  (MOTRIN) tablet 800 mg, 800 mg, Oral, Once PRN, Sally Guzmanst, D.O.  •  acetaminophen (TYLENOL) tablet 1,000 mg, 1,000 mg, Oral, Once PRN, Sally Guzmanst, D.O.  •  fentaNYL (SUBLIMAZE) injection 50 mcg, 50 mcg, Intravenous, Q HOUR PRN **OR** fentaNYL (SUBLIMAZE) injection 100 mcg, 100 mcg, Intravenous, Q HOUR PRN, Sally Guzmanst, D.O.  •  oxytocin (PITOCIN) infusion (for induction), 0.5-20 bobo-units/min, Intravenous, Continuous, Sally Guzmanst, D.O.  •  miSOPROStol (CYTOTEC) tablet 800 mcg, 800 mcg, Rectal, Once PRN, Sally Guzmanst, D.O.  •  penicillin G potassium 5 Million Units in  mL IVPB, 5 Million Units, Intravenous, Once **FOLLOWED BY** penicillin G potassium 2.5 Million Units in  mL IVPB, 2.5 Million Units, Intravenous, Q4HRS, Sally Guzmanst, D.O.    Allergies: Patient has no known allergies.    Social History     Socioeconomic History   • Marital status: Single     Spouse name: Not on file   • Number of children: Not on file   • Years of education: Not on file   • Highest education level: Not on file   Occupational History   • Not on file   Tobacco Use   • Smoking status: Never Smoker   • Smokeless tobacco: Never Used   Vaping Use   • Vaping Use: Never used   Substance and Sexual Activity   • Alcohol use: Not Currently     Comment: first time today   • Drug use: No   • Sexual activity: Yes     Partners: Male     Comment: Planned pregancy, . FOB involved and supportive.   Other Topics Concern   • Not on file   Social History Narrative   • Not on file     Social Determinants of Health     Financial Resource Strain: Not on file   Food Insecurity: Not on file   Transportation Needs: Not on file   Physical Activity: Not on file   Stress: Not on file   Social Connections: Not on file   Intimate Partner Violence: Not on file   Housing Stability: Not on file     No Prenatal care with the exception of 1 triage visit at 39 weeks for a limited ultrasound and some prenatal labs      Objective:      /76   " Pulse 91   Temp 36.3 °C (97.3 °F) (Temporal)   Ht 1.6 m (5' 3\")   Wt 113 kg (250 lb)   SpO2 97%     General:   no acute distress, alert   Skin:   normal   HEENT:  PERRLA   Lungs:   CTA bilateral   Heart:   chest is clear without rales or wheezing, no pedal edema   Abdomen:   soft, gravid, NT   EFW:  3300gr   Pelvis:  adequate with gynecoid pelvis   FHT:  150 BPM  Accels yes  Decels no  Variability mod  Category I   Uterine Size: S=D   Presentations: Cephalic   Cervix:  5 cm on nurse exam, fetal hair palpated through cervical os     Lab Review  Lab:   Blood type: O     Recent Results (from the past 5880 hour(s))   AMNISURE ROM ASSAY    Collection Time: 07/29/22  2:22 AM   Result Value Ref Range    AmniSure ROM Negative Negative   GRP B STREP, BY PCR (DIRECT)    Collection Time: 07/29/22  3:10 AM    Specimen: Vaginal; Genital   Result Value Ref Range    Strep Gp B DNA PCR POSITIVE (A) Negative   PRENATAL PANEL 3+HIV+HCV    Collection Time: 07/29/22  3:52 AM   Result Value Ref Range    WBC 9.9 4.8 - 10.8 K/uL    RBC 4.76 4.20 - 5.40 M/uL    Hemoglobin 12.4 12.0 - 16.0 g/dL    Hematocrit 38.4 37.0 - 47.0 %    MCV 80.7 (L) 81.4 - 97.8 fL    MCH 26.1 (L) 27.0 - 33.0 pg    MCHC 32.3 (L) 33.6 - 35.0 g/dL    RDW 41.4 35.9 - 50.0 fL    Platelet Count 231 164 - 446 K/uL    MPV 11.0 9.0 - 12.9 fL    Neutrophils-Polys 67.20 44.00 - 72.00 %    Lymphocytes 23.80 22.00 - 41.00 %    Monocytes 6.80 0.00 - 13.40 %    Eosinophils 0.60 0.00 - 6.90 %    Basophils 0.30 0.00 - 1.80 %    Immature Granulocytes 1.30 (H) 0.00 - 0.90 %    Nucleated RBC 0.00 /100 WBC    Neutrophils (Absolute) 6.65 2.00 - 7.15 K/uL    Lymphs (Absolute) 2.36 1.00 - 4.80 K/uL    Monos (Absolute) 0.67 0.00 - 0.85 K/uL    Eos (Absolute) 0.06 0.00 - 0.51 K/uL    Baso (Absolute) 0.03 0.00 - 0.12 K/uL    Immature Granulocytes (abs) 0.13 (H) 0.00 - 0.11 K/uL    NRBC (Absolute) 0.00 K/uL    Rubella IgG Antibody 203.00 IU/mL    Hepatitis B Surface Antigen " Non-Reactive Non-Reactive    Hepatitis C Antibody Non-Reactive Non-Reactive    Syphilis, Treponemal Qual Non-Reactive Non-Reactive   HIV AG/AB COMBO ASSAY SCREENING    Collection Time: 22  3:52 AM   Result Value Ref Range    HIV Ag/Ab Combo Assay Non-Reactive Non Reactive   OP Prenatal Panel-Blood Bank    Collection Time: 22  3:52 AM   Result Value Ref Range    ABO Grouping Only O     Rh Grouping Only POS     Antibody Screen Scrn NEG         Assessment:   Aleja Sanches at 40w4d  Labor status: Active phase labor.    Obstetrical history significant for   Patient Active Problem List    Diagnosis Date Noted   • Normal labor 2022   • Group beta Strep positive 2022   • Labor and delivery indication for care or intervention 2016   .      Plan:     Admit to L&D  GBS positive - will give PCN  Pitocin augmentation planned  Epidural as needed.  Patient states she went natural with all of her babies.  We will do some fingersticks given patient's lack of gestational diabetes testing.  Clear liquids while in labor  Fetal monitoring/toco  Anticipate

## 2022-08-06 NOTE — CARE PLAN
Problem: Knowledge Deficit - L&D  Goal: Patient and family/caregivers will demonstrate understanding of plan of care, disease process/condition, diagnostic tests and medications  Outcome: Progressing     Problem: Risk for Excess Fluid Volume  Goal: Patient will demonstrate pulse, blood pressure and neurologic signs within expected ranges and without any respiratory complications  Outcome: Progressing     Problem: Psychosocial - L&D  Goal: Patient's level of anxiety will decrease  Outcome: Progressing  Goal: Patient will be able to discuss coping skills during hospitalization  Outcome: Progressing  Goal: Patient's ability to re-evaluate and adapt role responsibilities will improve  Outcome: Progressing  Goal: Spiritual and cultural needs incorporated into hospitalization  Outcome: Progressing     Problem: Risk for Venous Thromboembolism (VTE)  Goal: VTE prevention measures will be implemented and patient will remain free from VTE  Outcome: Progressing     Problem: Risk for Infection and Impaired Wound Healing  Goal: Patient will remain free from infection  Outcome: Progressing  Goal: Patient's wound/surgical incision will decrease in size and heals properly  Outcome: Progressing     Problem: Risk for Fluid Imbalance  Goal: Patient's fluid volume balance will be maintained or improve  Outcome: Progressing     Problem: Risk for Injury  Goal: Patient and fetus will be free of preventable injury/complications  Outcome: Progressing     Problem: Pain  Goal: Patient's pain will be alleviated or reduced to the patient’s comfort goal  Outcome: Progressing     Problem: Discharge Barriers/Planning  Goal: Patient's continuum of care needs are met  Outcome: Progressing   The patient is Stable - Low risk of patient condition declining or worsening         Progress made toward(s) clinical / shift goals:  Progressing

## 2022-08-06 NOTE — PROGRESS NOTES
1345- Pt 40 and 4, here with complaints of SROM today at 1030. Pt stated she was about to get into the shower and felt a gush of fluid that was clear/bloody, pt reports no foul odor or color. Pt reported she saturated 4 pads and her underwear. Pt reports contraction 6-8 minutes apart that is a 6/10 on pain scale. SVE 5/60/-2, vertex. Report given to Dr. Ellis with the decision to admit the patient. Pt oriented to call light use and safety education provided.     1500- Dr. Ellis updated on patient blood sugar and Dr. Ellis gave orders to take blood sugars every hour. Pt educated on proper nutrition and verbalized understanding.    1710- Dr. Ellis at bedside. SVE 6/80/-2. Orders to start pitocin 2x2.  updated on pt blood sugar and said okay to stop finger sticks.      1900- Pt stable at this time. Report given to MECHELLE Roe.

## 2022-08-07 LAB
ERYTHROCYTE [DISTWIDTH] IN BLOOD BY AUTOMATED COUNT: 42.2 FL (ref 35.9–50)
HCT VFR BLD AUTO: 36 % (ref 37–47)
HGB BLD-MCNC: 11.7 G/DL (ref 12–16)
MCH RBC QN AUTO: 25.9 PG (ref 27–33)
MCHC RBC AUTO-ENTMCNC: 32.5 G/DL (ref 33.6–35)
MCV RBC AUTO: 79.6 FL (ref 81.4–97.8)
PLATELET # BLD AUTO: 200 K/UL (ref 164–446)
PMV BLD AUTO: 11 FL (ref 9–12.9)
RBC # BLD AUTO: 4.52 M/UL (ref 4.2–5.4)
WBC # BLD AUTO: 15 K/UL (ref 4.8–10.8)

## 2022-08-07 PROCEDURE — 36415 COLL VENOUS BLD VENIPUNCTURE: CPT

## 2022-08-07 PROCEDURE — 700102 HCHG RX REV CODE 250 W/ 637 OVERRIDE(OP): Performed by: ADVANCED PRACTICE MIDWIFE

## 2022-08-07 PROCEDURE — A9270 NON-COVERED ITEM OR SERVICE: HCPCS | Performed by: ADVANCED PRACTICE MIDWIFE

## 2022-08-07 PROCEDURE — 85027 COMPLETE CBC AUTOMATED: CPT

## 2022-08-07 PROCEDURE — 770002 HCHG ROOM/CARE - OB PRIVATE (112)

## 2022-08-07 RX ORDER — ACETAMINOPHEN AND CODEINE PHOSPHATE 120; 12 MG/5ML; MG/5ML
1 SOLUTION ORAL DAILY
Qty: 28 TABLET | Refills: 11 | Status: ON HOLD | OUTPATIENT
Start: 2022-08-07 | End: 2023-12-01

## 2022-08-07 RX ORDER — IBUPROFEN 800 MG/1
800 TABLET ORAL EVERY 8 HOURS PRN
Qty: 30 TABLET | Refills: 0 | Status: SHIPPED | OUTPATIENT
Start: 2022-08-07

## 2022-08-07 RX ADMIN — IBUPROFEN 800 MG: 800 TABLET, FILM COATED ORAL at 05:34

## 2022-08-07 RX ADMIN — ACETAMINOPHEN 1000 MG: 500 TABLET ORAL at 05:34

## 2022-08-07 RX ADMIN — DOCUSATE SODIUM 100 MG: 100 CAPSULE, LIQUID FILLED ORAL at 05:34

## 2022-08-07 ASSESSMENT — EDINBURGH POSTNATAL DEPRESSION SCALE (EPDS)
I HAVE LOOKED FORWARD WITH ENJOYMENT TO THINGS: AS MUCH AS I EVER DID
THINGS HAVE BEEN GETTING ON TOP OF ME: NO, I HAVE BEEN COPING AS WELL AS EVER
I HAVE BLAMED MYSELF UNNECESSARILY WHEN THINGS WENT WRONG: NOT VERY OFTEN
I HAVE FELT SAD OR MISERABLE: NO, NOT AT ALL
I HAVE BEEN ABLE TO LAUGH AND SEE THE FUNNY SIDE OF THINGS: AS MUCH AS I ALWAYS COULD
I HAVE BEEN SO UNHAPPY THAT I HAVE HAD DIFFICULTY SLEEPING: NOT AT ALL
THE THOUGHT OF HARMING MYSELF HAS OCCURRED TO ME: NEVER
I HAVE BEEN ANXIOUS OR WORRIED FOR NO GOOD REASON: NO, NOT AT ALL
I HAVE BEEN SO UNHAPPY THAT I HAVE BEEN CRYING: NO, NEVER
I HAVE FELT SCARED OR PANICKY FOR NO GOOD REASON: NO, NOT AT ALL

## 2022-08-07 ASSESSMENT — PAIN DESCRIPTION - PAIN TYPE
TYPE: ACUTE PAIN

## 2022-08-07 NOTE — LACTATION NOTE
This note was copied from a baby's chart.  Initial Consult:      at 40+4d.  Limited PNC.  Uncertain GDM.  Patricia +. Hx of breastfeeding previous 4 children for 5mos each.  Desires to breastfeed approx 12mo.        MOB independently breastfeedingin cradle position.   Basic breastfeeding information education given to include feeding baby with feeding cues and at least a minimum of 8x/24 hours.  It is not recommended to let baby sleep longer than 4 hours between feedings and if sleepy, place skin to skin to promote feeding interest and milk production.   Expect cluster feeding as this is normal during early days of life and growth spurts. Discussed recognition of early feeding cues and importance of deep latch.     It is not recommended to use pacifiers or bottle supplementation with formula until breast feeding and milk production is well established or at least 4 weeks.  There is a possible need for supplementation d/t patricia + and LGA.  Will be important to use paced bottle feeding if supplementation is needed.      Expect breast changes as breastmilk increases in volume.  Frequent feedings as well as looking for transitioning stools from dark meconium to bright yellow/green seedy loose stools by day 5.     [facility] Breastfeeding Resources given to MOB.    Referral sent to [facility]

## 2022-08-07 NOTE — L&D DELIVERY NOTE
Admit Date:   2022      Pregnancy Complications: no prenatal care  Medical Induction of Labor: no  GBS: positive, treated adequately  Anesthesia: none  Gestational Age at delivery: 40.4 weeks    Patient  progressed well and noted to be 9/90/-1. Through a few more contractions, patient reported increasing pressure and spontaneous pushing efforts. Room quickly set up for delivery. She pushed well with coaching to deliver viable female infant in FROYLAN position at  with coached pushing efforts. No nuchal noted. Infant placed to maternal abdomen where she was dried and stimulated. A spontaneous cry was noted. Apgar 8, 8      Pitocin infused via IV bolus. After delayed cord clamping, cord was doubly clamped and cut by father. Signs of placenta separation noted and gentle cord traction was completed. Intact placenta was delivered spontaneously at  where 3VC was noted. EBL 200ml. Fundus firm with minimal massage. Inspection of vulva and vagina was completed and left labial abrasion noted. Routine postpartum care was initiated as mother and infant stable. Infant weight is 4350g        Gala MATIAS CNM/ Dr. Ellis, Attending

## 2022-08-07 NOTE — CARE PLAN
Problem: Pain - Standard  Goal: Alleviation of pain or a reduction in pain to the patient’s comfort goal  Outcome: Progressing     Problem: Altered Physiologic Condition  Goal: Patient physiologically stable as evidenced by normal lochia, palpable uterine involution and vitals within normal limits  Outcome: Progressing        The patient is Stable - Low risk of patient condition declining or worsening    Shift Goals  Clinical Goals: pain control/ maintain firm fundus with light to scant bleeding  Patient Goals:   Family Goals:     Progress made toward(s) clinical / shift goals: Patient requests to call for pain medication when needed. Patient aware of available prn medication and available nonpharmacologic pain intervention. Patient able to care for self and infant at current pain level.  Fundus firm. Lochia light. Patient educated to call if saturating a pad in more than hour or for large clots.      Patient is not progressing towards the following goals:

## 2022-08-07 NOTE — PROGRESS NOTES
1900- report received from Roxi LUCERO, poc discussed, questions answered   1925- pt calls out reporting increased pressure, RN and CNM to bedside for assessment  1935- pt calls out reporting increased pressure, RN and CNM to bedside for assessment and preparation for delivery  2215- report called to Liliana Lucero  2239- pt voided, pericare performed, in wheelchair and on way to Postpartum with Carey RODRIGUEZ, receiving nurse notified

## 2022-08-07 NOTE — PROGRESS NOTES
Obstetrics & Gynecology Post-Delivery Progress Note    Date of Service      27 y.o.  s/p vaginal, spontaneous  Delivery date: 2022    Events  Breastfeeding well, no issues    Subjective  Pain: No  Bleeding: lochia minimal  PO's: taking regular diet  Voiding: without difficulty  Ambulating: yes  Passing flatus: Yes  Feeding: breastfeeding well    Objective  Temp:  [36.3 °C (97.3 °F)-37 °C (98.6 °F)] 37 °C (98.6 °F)  Pulse:  [67-91] 73  Resp:  [18] 18  BP: (125-143)/(66-77) 127/69  SpO2:  [97 %] 97 %    Physical Exam  General: well and resting  Chest/Breasts: nipples intact   Abdomen: nontender, normal bowel sounds  Fundus: firm and at umbilicus  Incision: not applicable, (vaginal delivery)  Perineum: deferred  Extremities: symmetric, calves nontender    Recent Labs     22  1421   WBC 13.4*   RBC 4.97   HEMOGLOBIN 13.1   HEMATOCRIT 40.3   MCV 81.1*   MCH 26.4*   RDW 42.3   PLATELETCT 219   MPV 10.6   NEUTSPOLYS 80.50*   LYMPHOCYTES 13.70*   MONOCYTES 4.00   EOSINOPHILS 0.30   BASOPHILS 0.20       Assessment/Plan  Aleja CRUZ Giovani Sanches is a 27 y.o.yo  s/p postpartum day #1  s/p vaginal, spontaneous    1. Post care: meeting all goals  2. Hemodynamics: awaiting CBC  3. Pain: controlled  4. Rh+, Rubella Immune  5. Method of Feeding: plans to breastfeed  6. Method of Contraception: POPs sending birth control today to pharmacy in event she does not keep postpartum appointment.   7. Disposition: likely home postpartum day 2    VTE prophylaxis: none indicated

## 2022-08-07 NOTE — PROGRESS NOTES
0700- Bedside report received from MECHELLE Ford.  Patient denied needs.  1105- Patient assessment done.  Patient reported she is voiding without difficulty and passing flatus.  Patient denied dizziness and reported she is walking without difficulty.  Patient denied need for pain intervention/medication at this time.  Reviewed plan of care.  Patient verbalized understanding.  FOB at bedside.

## 2022-08-07 NOTE — CARE PLAN
The patient is Stable - Low risk of patient condition declining or worsening    Shift Goals  Clinical Goals: labor progress  Patient Goals: pain control  Family Goals: support pt    Progress made toward(s) clinical / shift goals:    Problem: Psychosocial - L&D  Goal: Patient's ability to re-evaluate and adapt role responsibilities will improve  Outcome: Progressing     Problem: Risk for Infection and Impaired Wound Healing  Goal: Patient will remain free from infection  Outcome: Progressing

## 2022-08-07 NOTE — PROGRESS NOTES
Labor Check    S: Pt examined. Breathing through contractions but they are relatively mild    O:  Gen: AAO in NAD  Abd: gravid, nontender to palpation  SVE: 6 / 80 / -2  Ext: nontender bl, nonedematous    FHT: 150 / mod renae / reactive  [place]: q8min    A/P:  27 y.o.  at 40w4d in active labor  - epidural prn  - to start second dose of pcn now. Would start pitocin 2x2 at this time.

## 2022-08-07 NOTE — PROGRESS NOTES
Patient oriented to room and updated on plan of care. Education on feeding plan of every three hours with glucose algorithm in place. All questions answered.

## 2022-08-07 NOTE — PROGRESS NOTES
S: Pt is laying in bed reporting increased pressure with contractions. Partner at bedside.      O:    Vitals:    22 1600 22 1723 22 1750 22 1915   BP:  (!) 143/66 127/72 129/77   Pulse:  85 83 67   Temp: 36.4 °C (97.5 °F)  36.4 °C (97.6 °F) 36.3 °C (97.4 °F)   TempSrc: Temporal  Temporal Temporal   SpO2:       Weight:       Height:               FHTs:  Baseline 140, pos accels, no decels, moderate variability        [place]: Contractions q 3-4 minutes, strong to palpation, pitocin @ 8 milliunits        SVE: /-1     A/P:    1.  IUP @ 40w4d   2.  Cat I FHTs - monitor  3.  GBS positive - continue abx  4.  Active Labor- anticipate     POLLY Wu, CNM

## 2022-08-08 VITALS
HEIGHT: 63 IN | OXYGEN SATURATION: 97 % | SYSTOLIC BLOOD PRESSURE: 124 MMHG | WEIGHT: 250 LBS | BODY MASS INDEX: 44.3 KG/M2 | TEMPERATURE: 97.3 F | RESPIRATION RATE: 18 BRPM | HEART RATE: 77 BPM | DIASTOLIC BLOOD PRESSURE: 77 MMHG

## 2022-08-08 ASSESSMENT — PAIN DESCRIPTION - PAIN TYPE: TYPE: ACUTE PAIN

## 2022-08-08 NOTE — DISCHARGE SUMMARY
Discharge Summary:     Date of Admission: 2022  Date of Discharge: 22      Admitting diagnosis:    1. Pregnancy @ 40w4d  2. No prenatal care      Discharge Diagnosis:   1. Status post vaginal, spontaneous.    No past medical history on file.  OB History    Para Term  AB Living   6 5 5 0 1 5   SAB IAB Ectopic Molar Multiple Live Births   1       0 5      # Outcome Date GA Lbr [place]/2nd Weight Sex Delivery Anes PTL Lv   6 Term 22 40w4d 09:10 / 00:30 4.35 kg (9 lb 9.4 oz) F Vag-Spont None N TIMOTHY   5 Term 20 41w0d / 00:06 3.565 kg (7 lb 13.8 oz) F Vag-Spont None N TIMOTHY   4 Term  40w0d  4.132 kg (9 lb 1.8 oz) F Vag-Spont None     3 Term  40w0d  4.082 kg (9 lb) M Vag-Spont   TIMOTHY   2 Term  40w0d  3.799 kg (8 lb 6 oz) M Vag-Spont   TIMOTHY   1 SAB              No past surgical history on file.  Patient has no known allergies.    Patient Active Problem List   Diagnosis   • Labor and delivery indication for care or intervention   • Group beta Strep positive   • Normal labor       Hospital Course:   Pt is a 27 y.o. now  who presented on 2022 in active labor following pregnancy c/b no prenatal care. Single female infant was delivered via  at 40w4d. Infant birth weight 4.35 kg. Apgars 8, 8 at 1 and 5 minutes respectively.  ml.     Postpartum course notable for early ambulation, well managed pain, tolerance of diet, spontaneous voiding, and appropriate feeding of infant. She has remained afebrile and blood pressure has been well controlled. All maternal questions and concerns addressed    Physical Exam:  Temp:  [36.3 °C (97.3 °F)-36.7 °C (98 °F)] 36.3 °C (97.3 °F)  Pulse:  [74-77] 77  Resp:  [16-18] 18  BP: (124)/(72-77) 124/77  SpO2:  [97 %] 97 %  Physical Exam  General: well appearing, no apparent distress  CV: RRR, nl S1 and S2, no mumurs  Resp: Unlabored, symmetric chest rise, CTAB  Abdomen: soft, nontender, nondistended  Fundus: firm at level below  umbilicus  Perineum: Deferred  Extremities: symmetric, no peripheral edema, calves nontender  Neuro: no gross focal deficits    Current Facility-Administered Medications   Medication Dose   • LR infusion     • oxytocin (PITOCIN) infusion (for post delivery)  125 mL/hr   • oxytocin (PITOCIN) infusion (for induction)  0.5-20 bobo-units/min   • lactated ringers infusion     • docusate sodium (COLACE) capsule 100 mg  100 mg   • ibuprofen (MOTRIN) tablet 800 mg  800 mg   • acetaminophen (TYLENOL) tablet 1,000 mg  1,000 mg   • tetanus-dipth-acell pertussis (ADACEL) inj 0.5 mL  0.5 mL       Recent Labs     08/06/22  1421 08/07/22  0539   WBC 13.4* 15.0*   RBC 4.97 4.52   HEMOGLOBIN 13.1 11.7*   HEMATOCRIT 40.3 36.0*   MCV 81.1* 79.6*   MCH 26.4* 25.9*   MCHC 32.5* 32.5*   RDW 42.3 42.2   PLATELETCT 219 200   MPV 10.6 11.0         Activity/ Discharge Instructions::   Discharge to home  Pelvic Rest x 6 weeks  No heavy lifting x4 weeks  Call or come to ED for: heavy vaginal bleeding, fever >100.4, severe abdominal pain, severe headache, chest pain, shortness of breath,  N/V, incisional drainage, or other concerns.       Follow up:  [facility] in 6 weeks for vaginal delivery    Discharge Meds:   Current Outpatient Medications   Medication Sig Dispense Refill   • norethindrone (MICRONOR) 0.35 MG tablet Take 1 Tablet by mouth every day. 28 Tablet 11   • ibuprofen (MOTRIN) 800 MG Tab Take 1 Tablet by mouth every 8 hours as needed for Moderate Pain. 30 Tablet 0           Karin Spears M.D.

## 2022-08-08 NOTE — CARE PLAN
The patient is Stable - Low risk of patient condition declining or worsening    Shift Goals  Clinical Goals: discharge  Patient Goals: pain control  Family Goals: support pt    Progress made toward(s) clinical / shift goals:  ambulating without diffculty. Vss lochia light    Patient is not progressing towards the following goals:

## 2022-08-08 NOTE — CARE PLAN
The patient is Stable - Low risk of patient condition declining or worsening    Shift Goals  Clinical Goals: Maintain fundus firm, lochia light; pain management  Patient Goals: pain control  Family Goals: support pt    Progress made toward(s) clinical / shift goals:  Fundus firm, lochia scant; patient denied need for pain intervention/medication throughout the shift.

## 2022-08-08 NOTE — DISCHARGE PLANNING
Discharge Planning Assessment Post Partum    Reason for Referral: No prenatal care  Address: [address] [place] [address] [place] [address]  Phone: [phone]  Type of Living Situation: living with FOB and children  Mom Diagnosis: Pregnancy  Baby Diagnosis: -40.4 weeks  Primary Language: English    Name of Baby: Justine Romano (: 22)  Father of the Baby: Ryley Romano  Involved in baby’s care? 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  Other children in the home/names & ages: four children; ages 7, 6, 5, and 22 months    Financial Hardship/Income: No   Mom's Mental status: alert and oriented  Services used prior to admit: pending Medicaid and WIC    CPS History: No  Psychiatric History: No, MOB scored a 1 on the EPDS screen  Domestic Violence History: No  Drug/ETOH History: No, infant's UDS is negative    Resources Provided: post partum support and counseling resources and a children and family resource list provided  Referrals Made: diaper bank referral provided     Clearance for Discharge: Infant is cleared to discharge home with parents once medically cleared

## 2022-08-08 NOTE — DISCHARGE INSTRUCTIONS
Pelvic rest x6 weeks  Return for follow up visit in 6 weeks.  Call or come to ED for: heavy vaginal bleeding, fever >100.4, severe abdominal pain, severe headache, chest pain, shortness of breath,  N/V, or other concerns.  PATIENT DISCHARGE EDUCATION INSTRUCTION SHEET  REASONS TO CALL YOUR PEDIATRICIAN  Projectile or forceful vomiting for more than one feeding  Unusual rash lasting more than 24 hours  Very sleepy, difficult to wake up  Bright yellow or pumpkin colored skin with extreme sleepiness  Temperature below 97.6 or above 100.4 F rectally  Feeding problems  Breathing problems  Excessive crying with no known cause  If cord starts to become red, swollen, develops a smell or discharge  No wet diaper or stool in a 24 hour time period     REASONS TO CALL YOUR OBSTETRICIAN  Persistent fever, shaking, chills (Temperature higher than 100.4) may indicate you have an infection  Heavy bleeding: soaking more than 1 pad per hour; Passing clots an egg-sized clot or bigger may mean you have an postpartum hemorrhage  Foul odor from vagina or bad smelling or discolored discharge or blood  Breast infection (Mastitis symptoms); breast pain, chills, fever, redness or red streaks, may feel flu like symptoms  Urinary pain, burning or frequency  Incision that is not healing, increased redness, swelling, tenderness or pain, or any pus from episiotomy or  site may mean you have an infection  Redness, swelling, warmth, or painful to touch in the calf area of your leg may mean you have a blood clot  Severe or intensified depression, thoughts or feelings of wanting to hurt yourself or someone else   Pain in chest, obstructed breathing or shortness of breath (trouble catching your breath) may mean you are having a postpartum complication. Call your provider immediately   Headache that does not get better, even after taking medicine, a bad headache with vision changes or pain in the upper right area of your belly may mean you  have high blood pressure or post birth preeclampsia. Call your provider immediately    SAFE SLEEP POSITIONING FOR YOUR BABY  The [employer] advises your baby should be placed on his/her back for Sleeping to reduce the risk of Sudden Infant Death Syndrome (SIDS)  Baby should sleep by themselves in a crib, portable crib or bassinet  Baby should not share a bed with his/her parents  Baby should be placed on his or her back to sleep, night time and at naps  Baby should sleep on firm mattress with a tightly fitted sheet  NO couches, waterbeds or anything soft  Baby's sleep area should not contain any loose blankets, comforters, stuffed animals or any other soft items, (pillows, bumper pads, etc. ...)  Baby's face should be kept uncovered at all times  Baby should sleep in a smoke-free environment  Do not dress baby too warmly to prevent overheating    HAND WASHING  All family and friends should wash their hands:  Before and after holding the baby  Before feeding the baby  After using the restroom or changing the baby's diaper     CARE    TAKING BABY'S TEMPERATURE  If you feel your baby may have a fever take your baby's temperature per thermometer instructions  If taking axillary temperature place thermometer under baby's armpit and hold arm close to body  The most precise and accurate way to take a temperature is rectally  Turn on the digital thermometer and lubricate the tip of the thermometer with petroleum jelly.  Lay your baby or child on his or her back, lift his or her thighs, and insert the lubricated thermometer 1/2 to 1 inch (1.3 to 2.5 centimeters) into the rectum  Call your Pediatrician for temperature lower than 97.6 or greater than 100.4 F rectally    BATHE AND SHAMPOO BABY  Gently wash baby with a soft cloth using warm water and mild soap - rinse well  Do not put baby in tub bath until umbilical cord falls off and appears well-healed  Bathing baby 2-3 times a week might be enough  until your baby becomes more mobile. Bathing your baby too much can dry out his or her skin     NAIL CARE  First recommendation is to keep them covered to prevent facial scratching  During the first few weeks,  nails are very soft. Doctors recommend using only a fine emery board. Don't bite or tear your baby's nails. When your baby's nails are stronger, after a few weeks, you can switch to clippers or scissors making sure not to cut too short and nip the quick   A good time for nail care is while your baby is sleeping and moving less    CORD CARE  Fold diaper below umbilical cord until cord falls off  Keep umbilical cord clean and dry  May see a small amount of crust around the base of the cord. Clean off with mild soap and water and dry             DIAPER AND DRESS BABY  For baby girls: gently wipe from front to back. Mucous or pink tinged drainage is normal  For uncircumcised baby boys: do NOT pull back the foreskin to clean the penis. Gently clean with wipes or warm, soapy water  Dress baby in one more layer of clothing than you are wearing  Use a hat to protect from sun or cold. NO ties or drawstrings    URINATION AND BOWEL MOVEMENTS  If formula feeding or when breast milk feeding is established, your baby should wet 6-8 diapers a day and have at least 2 bowel movements a day during the first month  Bowel movements color and type can vary from day to day    CIRCUMCISION  What to watch out for:  Foul smelling discharge  Fever  Swelling   Crusty, fluid filled sores  Trouble urinating   Persistent bleeding or more than a quarter size spot of blood on his diaper  Yellow discharge lasting more than a week  Continue with care procedures until healed or have a visit with your Pediatrician     INFANT FEEDING  Most newborns feed 8-12 times, every 24 hours. YOU MAY NEED TO WAKE YOUR BABY UP TO FEED  If breastfeeding, offer both breasts when your baby is showing feeding cues, such as rooting or bringing hand to  mouth and sucking  Common for  babies to feed every 1-3 hours   Only allow baby to sleep up to 4 hours in between feeds if baby is feeding well at each feed. Offer breast anytime baby is showing feeding cues and at least every 3 hours  Follow up with outpatient [facility] for continued breast feeding support    FORMULA FEEDING  Feed baby formula every 2-3 hours when your baby is showing feeding cues  Paced bottle feeding will help baby not over eat at each feed     BOTTLE FEEDING   Paced Bottle Feeding is a method of bottle feeding that allows the infant to be more in control of the feeding pace. This feeding method slows down the flow of milk into the nipple and the mouth, allowing the baby to eat more slowly, and take breaks. Paced feeding reduces the risk of overfeeding that may result in discomfort for the baby   Hold baby almost upright or slightly reclined position supporting the head and neck  Use a small nipple for slow-flowing. Slow flow nipple holes help in controlling flow   Don't force the bottle's nipple into your baby's mouth. Tickle babies lip so baby opens their mouth  Insert nipple and hold the bottle flat  Let the baby suck three to four times without milk then tip the bottle just enough to fill the nipple about care home with milk  Let baby suck 3-5 continuous swallows, about 20-30 seconds tip the bottle down to give the baby a break  After a few seconds, when the baby begins to suck again, tip bottle up to allow milk to flow into the nipple  Continue to Pace feed until baby shows signs of fullness; no longer sucking after a break, turning away or pushing away the nipple   Bottle propping is not a recommended practice for feeding  Bottle propping is when you give a baby a bottle by leaning the bottle against a pillow, or other support, rather than holding the baby and the bottle.  Forces your baby to keep up with the flow, even if the baby is full   This can increase your baby's  "risk of choking, ear infections, and tooth decay    BOTTLE PREPARATION   Never feed  formula to your baby, or use formula if the container is dented  When using ready-to-feed, shake formula containers before opening  If formula is in a can, clean the lid of any dust, and be sure the can opener is clean  Formula does not need to be warmed. If you choose to feed warmed formula, do not microwave it. This can cause \"hot spots\" that could burn your baby. Instead, set the filled bottle in a bowl of warm (not boiling) water or hold the bottle under warm tap water. Sprinkle a few drops of formula on the inside of your wrist to make sure it's not too hot  Measure and pour desired amount of water into baby bottle  Add unpacked, level scoop(s) of powder to the bottle as directed on formula container. Return dry scoop to can  Put the cap on the bottle and shake. Move your wrist in a twisting motion helps powder formula mix more quickly and more thoroughly  Feed or store immediately in refrigerator  You need to sterilize bottles, nipples, rings, etc., only before the first use    CLEANING BOTTLE  Use hot, soapy water  Rinse the bottles and attachments separately and clean with a bottle brush  If your bottles are labelled  safe, you can alternatively go ahead and wash them in the    After washing, rinse the bottle parts thoroughly in hot running water to remove any bubbles or soap residue   Place the parts on a bottle drying rack   Make sure the bottles are left to drain in a well-ventilated location to ensure that they dry thoroughly  CAR SEAT  For your baby's safety and to comply with [facility] Law you will need to bring a car seat to the hospital before taking your baby home. Please read your car seat instructions before your baby's discharge from the hospital.  Make sure you place an emergency contact sticker on your baby's car seat with your baby's identifying information  Car seat should not be " placed in the front seat of a vehicle. The car seat should be placed in the back seat in the rear-facing position.  Car seat information is available through [employer] Se[employer] at [phone] and also at [email or web address]    MATERNAL CARE     WOUND CARE  Ask your physician for additional care instructions. In general:   Incision:  May shower and pat incision dry   Keep the incision clean and dry  There should not be any opening or pus from the incision  Continue to walk at home 3 times a day   Do NOT lift anything heavier than your baby (over 10 pounds)  Encourage family to help participate in care of the  to allow rest and mom time to heal    Episiotomy/Laceration  May use nery-spray bottle, witch hazel pads and dermaplast spray for comfort  Use nery-spray bottle after urinating to cleanse perineal area  To prevent burning during urination spray nery-water bottle on labial area   Pat perineal area dry until episiotomy/laceration is healed  Continue to use nery-bottle until bleeding stops as needed  If have a 2nd degree laceration or greater, a Sitz bath can offer relief from soreness, burning, and inflammation   Sitz Bath   Sit in 6 inches of warm water and soak laceration as needed until the laceration heals    VAGINAL CARE AND BLEEDING  Nothing inside vagina for 6 weeks:   No sexual intercourse, tampons or douching  Bleeding may continue for 2-4 weeks. Amount and color may vary  Soaking 1 pad or more in an hour for several hours is considered heavy bleeding  Passing large egg sized blood clots can be concerning  If you feel like you have heavy bleeding or are having increasing amount of blood clots call your Obstetrician immediately  If you begin feeling faint upon standing, feeling sick to your stomach, have clammy skin, a really fast heartbeat, have chills, start feeling confused, dizzy, sleepy or weak, or feeling like you're going to faint call your Obstetrician immediately    HYPERTENSION  "  Preeclampsia or gestational hypertension are types of high blood pressure that only pregnant women can get. It is important for you to be aware of symptoms to seek early intervention and treatment. If you have any of these symptoms immediately call your Obstetrician    Vision changes or blurred vision   Severe headache or pain that is unrelieved with medication and will not go away  Persistent pain in upper abdomen or shoulder   Increased swelling of face, feet, or hands  Difficulty breathing or shortness of breath at rest  Urinating less than usual    URINATION AND BOWEL MOVEMENTS  Eating more fiber (bran cereal, fruits, and vegetables) and drinking plenty of fluids will help to avoid constipation  Urinary frequency and urgency after childbirth is normal  If you experience any urinary pain, burning or frequency call your provider    BIRTH CONTROL  It is possible to become pregnant at any time after delivery and while breastfeeding  Plan to discuss a method of birth control with your physician at your post-delivery follow up visit    POSTPARTUM BLUES  During the first few days after birth, you may experience a sense of the \"blues\" which may include impatience, irritability or even crying. These feelings come and go quickly. However, as many as 1 in 10 women experience emotional symptoms known as postpartum depression.     POSTPARTUM DEPRESSION    May start as early as the second or third day after delivery or take several weeks or months to develop. Symptoms of \"blues\" are present, but are more intense: Crying spells; loss of appetite; feelings of hopelessness or loss of control; fear of touching the baby; over concern or no concern at all about the baby; little or no concern about your own appearance/caring for yourself; and/or inability to sleep or excessive sleeping. Contact your Obstetrician if you are experiencing any of these symptoms     PREVENTING SHAKEN BABY  If you are angry or stressed, PUT THE BABY IN " "THE CRIB, step away, take some deep breaths, and wait until you are calm to care for the baby. DO NOT SHAKE THE BABY. You are not alone, call a supporter for help.  [facility] 24/7 crisis call line (517-874-3269) or (1-642.657.7858)  You can also text them, text \"ANSWER\" (501158)      "

## 2022-08-08 NOTE — LACTATION NOTE
This note was copied from a baby's chart.  Follow-up visit, mother breastfeeding independently, observed baby at breast LATCH of 9. Reviewed milk onset, cluster feeding, frequency of feeds, stools changes. Plan to continue cue based breastfeeding at least 8 or more times per 24 hours. Provided list of community breastfeeding resources and support [place]. Denies questions/concerns.

## 2022-08-08 NOTE — CARE PLAN
The patient is Stable - Low risk of patient condition declining or worsening    Shift Goals  Clinical Goals: Maintain fundus firm, lochia light; pain management  Patient Goals: pain control  Family Goals: support pt    Progress made toward(s) clinical / shift goals:        Problem: Pain - Standard  Goal: Alleviation of pain or a reduction in pain to the patient’s comfort goal  Outcome: Progressing     Problem: Knowledge Deficit - Postpartum  Goal: Patient will verbalize and demonstrate understanding of self and infant care  Outcome: Progressing     Problem: Psychosocial - Postpartum  Goal: Patient will verbalize and demonstrate effective bonding and parenting behavior  Outcome: Progressing     Problem: Altered Physiologic Condition  Goal: Patient physiologically stable as evidenced by normal lochia, palpable uterine involution and vitals within normal limits  Outcome: Progressing       Patient is not progressing towards the following goals:

## 2022-08-08 NOTE — NON-PROVIDER
"Obstetrics & Gynecology Postpartum Note       ID: 27 y.o.  s/p  on  at        Overnight Events: VSS. No acute overnight events       Subjective:     Patient reports that bleeding is minimal, less than a normal period. Pain has been tolerable with tylenol/ibuprofen. She has been eating, ambulating, voiding and stooling normally. She is breastfeeding and states that baby is latching well, and she has no concerns at this time. Patient did have limited prenatal care, and does not have an OB/GYN to follow-up with at this time.      Objective:      /77   Pulse 77   Temp 36.3 °C (97.3 °F) (Temporal)   Resp 18   Ht 1.6 m (5' 3\")   Wt 113 kg (250 lb)   LMP 10/26/2021   SpO2 97%   Breastfeeding Yes   BMI 44.29 kg/m²     Physical Exam:     General: Well appearing, sitting up in bed  HEENT: AT/NC, EOMI, good conjugate gaze   Cardio: RRR, no m/r/g  Resp: CTAB, no increased respiratory effort   Abdomen: Non-tender, soft, non-distended   Fundus: Firm 2-3 cm below umbilicus   Extremities: No peripheral edema   Neuro: CN II-XII grossly intact     Recent Labs:     Recent Labs     22  1421 22  0539   WBC 13.4* 15.0*   RBC 4.97 4.52   HEMOGLOBIN 13.1 11.7*   HEMATOCRIT 40.3 36.0*   MCV 81.1* 79.6*   MCH 26.4* 25.9*   RDW 42.3 42.2   PLATELETCT 219 200   MPV 10.6 11.0   NEUTSPOLYS 80.50*  --    LYMPHOCYTES 13.70*  --    MONOCYTES 4.00  --    EOSINOPHILS 0.30  --    BASOPHILS 0.20  --        Assessment/Plan:   This is a 27 y.o.  s/p  on  at 2012 postpartum day #2     - Routine postpartum care  - Tylenol, ibuprofen for pain   - Breast feeding well   - Follow-up with OB/GYN in 3 weeks     Disposition: Discharge home today         "

## 2022-08-08 NOTE — PROGRESS NOTES
1703- Patient stated desire for discharge home tomorrow and was encouraged to read the written patient discharge education/instruction sheet.

## 2022-08-09 NOTE — PROGRESS NOTES
Discharged home with baby. Ambulated to car without difficulty. Instructions given on infant care and safety , self care, and reasons to call the

## 2023-11-30 ENCOUNTER — HOSPITAL ENCOUNTER (INPATIENT)
Facility: MEDICAL CENTER | Age: 28
LOS: 2 days | End: 2023-12-02
Attending: OBSTETRICS & GYNECOLOGY | Admitting: OBSTETRICS & GYNECOLOGY

## 2023-11-30 ENCOUNTER — APPOINTMENT (OUTPATIENT)
Dept: RADIOLOGY | Facility: MEDICAL CENTER | Age: 28
End: 2023-11-30
Attending: OBSTETRICS & GYNECOLOGY

## 2023-11-30 DIAGNOSIS — Z37.9 NORMAL LABOR: Primary | ICD-10-CM

## 2023-11-30 LAB
ABO GROUP BLD: NORMAL
BASOPHILS # BLD AUTO: 0.2 % (ref 0–1.8)
BASOPHILS # BLD: 0.02 K/UL (ref 0–0.12)
BLD GP AB SCN SERPL QL: NORMAL
EOSINOPHIL # BLD AUTO: 0.03 K/UL (ref 0–0.51)
EOSINOPHIL NFR BLD: 0.3 % (ref 0–6.9)
ERYTHROCYTE [DISTWIDTH] IN BLOOD BY AUTOMATED COUNT: 41.5 FL (ref 35.9–50)
GP B STREP DNA SPEC QL NAA+PROBE: POSITIVE
HBV SURFACE AG SER QL: NORMAL
HCT VFR BLD AUTO: 36 % (ref 37–47)
HCV AB SER QL: NORMAL
HGB BLD-MCNC: 11.9 G/DL (ref 12–16)
HIV 1+2 AB+HIV1 P24 AG SERPL QL IA: NORMAL
HOLDING TUBE BB 8507: NORMAL
IMM GRANULOCYTES # BLD AUTO: 0.05 K/UL (ref 0–0.11)
IMM GRANULOCYTES NFR BLD AUTO: 0.5 % (ref 0–0.9)
LYMPHOCYTES # BLD AUTO: 1.34 K/UL (ref 1–4.8)
LYMPHOCYTES NFR BLD: 13.4 % (ref 22–41)
MCH RBC QN AUTO: 26.1 PG (ref 27–33)
MCHC RBC AUTO-ENTMCNC: 33.1 G/DL (ref 32.2–35.5)
MCV RBC AUTO: 78.9 FL (ref 81.4–97.8)
MONOCYTES # BLD AUTO: 0.72 K/UL (ref 0–0.85)
MONOCYTES NFR BLD AUTO: 7.2 % (ref 0–13.4)
NEUTROPHILS # BLD AUTO: 7.86 K/UL (ref 1.82–7.42)
NEUTROPHILS NFR BLD: 78.4 % (ref 44–72)
NRBC # BLD AUTO: 0 K/UL
NRBC BLD-RTO: 0 /100 WBC (ref 0–0.2)
PLATELET # BLD AUTO: 228 K/UL (ref 164–446)
PMV BLD AUTO: 10.8 FL (ref 9–12.9)
RBC # BLD AUTO: 4.56 M/UL (ref 4.2–5.4)
RH BLD: NORMAL
RUBV AB SER QL: 191 IU/ML
T PALLIDUM AB SER QL IA: NORMAL
T PALLIDUM AB SER QL IA: NORMAL
WBC # BLD AUTO: 10 K/UL (ref 4.8–10.8)

## 2023-11-30 PROCEDURE — 700111 HCHG RX REV CODE 636 W/ 250 OVERRIDE (IP): Mod: JZ

## 2023-11-30 PROCEDURE — 304965 HCHG RECOVERY SERVICES

## 2023-11-30 PROCEDURE — 86900 BLOOD TYPING SEROLOGIC ABO: CPT

## 2023-11-30 PROCEDURE — 99283 EMERGENCY DEPT VISIT LOW MDM: CPT

## 2023-11-30 PROCEDURE — 87340 HEPATITIS B SURFACE AG IA: CPT

## 2023-11-30 PROCEDURE — 86803 HEPATITIS C AB TEST: CPT

## 2023-11-30 PROCEDURE — 86592 SYPHILIS TEST NON-TREP QUAL: CPT

## 2023-11-30 PROCEDURE — 86762 RUBELLA ANTIBODY: CPT

## 2023-11-30 PROCEDURE — 302449 STATCHG TRIAGE ONLY (STATISTIC)

## 2023-11-30 PROCEDURE — 700101 HCHG RX REV CODE 250: Performed by: OBSTETRICS & GYNECOLOGY

## 2023-11-30 PROCEDURE — 59409 OBSTETRICAL CARE: CPT

## 2023-11-30 PROCEDURE — 36415 COLL VENOUS BLD VENIPUNCTURE: CPT

## 2023-11-30 PROCEDURE — 700105 HCHG RX REV CODE 258: Performed by: OBSTETRICS & GYNECOLOGY

## 2023-11-30 PROCEDURE — 4A1HXCZ MONITORING OF PRODUCTS OF CONCEPTION, CARDIAC RATE, EXTERNAL APPROACH: ICD-10-PCS | Performed by: OBSTETRICS & GYNECOLOGY

## 2023-11-30 PROCEDURE — 86780 TREPONEMA PALLIDUM: CPT | Mod: 91

## 2023-11-30 PROCEDURE — 87389 HIV-1 AG W/HIV-1&-2 AB AG IA: CPT

## 2023-11-30 PROCEDURE — 86850 RBC ANTIBODY SCREEN: CPT

## 2023-11-30 PROCEDURE — 85025 COMPLETE CBC W/AUTO DIFF WBC: CPT

## 2023-11-30 PROCEDURE — 76815 OB US LIMITED FETUS(S): CPT

## 2023-11-30 PROCEDURE — 770002 HCHG ROOM/CARE - OB PRIVATE (112)

## 2023-11-30 PROCEDURE — 700105 HCHG RX REV CODE 258

## 2023-11-30 PROCEDURE — 86901 BLOOD TYPING SEROLOGIC RH(D): CPT

## 2023-11-30 PROCEDURE — 59409 OBSTETRICAL CARE: CPT | Performed by: OBSTETRICS & GYNECOLOGY

## 2023-11-30 PROCEDURE — 87653 STREP B DNA AMP PROBE: CPT

## 2023-11-30 PROCEDURE — 700111 HCHG RX REV CODE 636 W/ 250 OVERRIDE (IP): Mod: JZ | Performed by: OBSTETRICS & GYNECOLOGY

## 2023-11-30 RX ORDER — ACETAMINOPHEN 500 MG
1000 TABLET ORAL EVERY 6 HOURS PRN
Status: DISCONTINUED | OUTPATIENT
Start: 2023-11-30 | End: 2023-12-02 | Stop reason: HOSPADM

## 2023-11-30 RX ORDER — OXYTOCIN 10 [USP'U]/ML
10 INJECTION, SOLUTION INTRAMUSCULAR; INTRAVENOUS
Status: DISCONTINUED | OUTPATIENT
Start: 2023-11-30 | End: 2023-11-30 | Stop reason: HOSPADM

## 2023-11-30 RX ORDER — ONDANSETRON 4 MG/1
4 TABLET, ORALLY DISINTEGRATING ORAL EVERY 6 HOURS PRN
Status: DISCONTINUED | OUTPATIENT
Start: 2023-11-30 | End: 2023-11-30 | Stop reason: HOSPADM

## 2023-11-30 RX ORDER — ACETAMINOPHEN 500 MG
1000 TABLET ORAL
Status: DISCONTINUED | OUTPATIENT
Start: 2023-11-30 | End: 2023-11-30 | Stop reason: HOSPADM

## 2023-11-30 RX ORDER — METHYLERGONOVINE MALEATE 0.2 MG/ML
0.2 INJECTION INTRAVENOUS
Status: DISCONTINUED | OUTPATIENT
Start: 2023-11-30 | End: 2023-11-30 | Stop reason: HOSPADM

## 2023-11-30 RX ORDER — LIDOCAINE HYDROCHLORIDE 10 MG/ML
20 INJECTION, SOLUTION INFILTRATION; PERINEURAL
Status: DISCONTINUED | OUTPATIENT
Start: 2023-11-30 | End: 2023-11-30 | Stop reason: HOSPADM

## 2023-11-30 RX ORDER — CARBOPROST TROMETHAMINE 250 UG/ML
250 INJECTION, SOLUTION INTRAMUSCULAR
Status: DISCONTINUED | OUTPATIENT
Start: 2023-11-30 | End: 2023-11-30 | Stop reason: HOSPADM

## 2023-11-30 RX ORDER — IBUPROFEN 800 MG/1
800 TABLET ORAL
Status: DISCONTINUED | OUTPATIENT
Start: 2023-11-30 | End: 2023-11-30 | Stop reason: HOSPADM

## 2023-11-30 RX ORDER — ONDANSETRON 2 MG/ML
4 INJECTION INTRAMUSCULAR; INTRAVENOUS EVERY 6 HOURS PRN
Status: DISCONTINUED | OUTPATIENT
Start: 2023-11-30 | End: 2023-11-30 | Stop reason: HOSPADM

## 2023-11-30 RX ORDER — DOCUSATE SODIUM 100 MG/1
100 CAPSULE, LIQUID FILLED ORAL 2 TIMES DAILY PRN
Status: DISCONTINUED | OUTPATIENT
Start: 2023-11-30 | End: 2023-12-02 | Stop reason: HOSPADM

## 2023-11-30 RX ORDER — SODIUM CHLORIDE, SODIUM LACTATE, POTASSIUM CHLORIDE, CALCIUM CHLORIDE 600; 310; 30; 20 MG/100ML; MG/100ML; MG/100ML; MG/100ML
INJECTION, SOLUTION INTRAVENOUS PRN
Status: DISCONTINUED | OUTPATIENT
Start: 2023-11-30 | End: 2023-12-02 | Stop reason: HOSPADM

## 2023-11-30 RX ORDER — MISOPROSTOL 200 UG/1
800 TABLET ORAL
Status: DISCONTINUED | OUTPATIENT
Start: 2023-11-30 | End: 2023-11-30 | Stop reason: HOSPADM

## 2023-11-30 RX ORDER — IBUPROFEN 800 MG/1
800 TABLET ORAL EVERY 8 HOURS PRN
Status: DISCONTINUED | OUTPATIENT
Start: 2023-11-30 | End: 2023-12-02 | Stop reason: HOSPADM

## 2023-11-30 RX ORDER — MISOPROSTOL 200 UG/1
600 TABLET ORAL
Status: DISCONTINUED | OUTPATIENT
Start: 2023-11-30 | End: 2023-12-02 | Stop reason: HOSPADM

## 2023-11-30 RX ORDER — SODIUM CHLORIDE, SODIUM LACTATE, POTASSIUM CHLORIDE, CALCIUM CHLORIDE 600; 310; 30; 20 MG/100ML; MG/100ML; MG/100ML; MG/100ML
INJECTION, SOLUTION INTRAVENOUS CONTINUOUS
Status: DISCONTINUED | OUTPATIENT
Start: 2023-11-30 | End: 2023-12-02 | Stop reason: HOSPADM

## 2023-11-30 RX ORDER — TERBUTALINE SULFATE 1 MG/ML
0.25 INJECTION, SOLUTION SUBCUTANEOUS
Status: DISCONTINUED | OUTPATIENT
Start: 2023-11-30 | End: 2023-11-30 | Stop reason: HOSPADM

## 2023-11-30 RX ORDER — OXYCODONE HYDROCHLORIDE 5 MG/1
5 TABLET ORAL EVERY 4 HOURS PRN
Status: DISCONTINUED | OUTPATIENT
Start: 2023-11-30 | End: 2023-12-02 | Stop reason: HOSPADM

## 2023-11-30 RX ADMIN — TRANEXAMIC ACID 1000 MG: 100 INJECTION, SOLUTION INTRAVENOUS at 10:33

## 2023-11-30 RX ADMIN — OXYTOCIN 20 UNITS: 10 INJECTION, SOLUTION INTRAMUSCULAR; INTRAVENOUS at 12:42

## 2023-11-30 RX ADMIN — SODIUM CHLORIDE, POTASSIUM CHLORIDE, SODIUM LACTATE AND CALCIUM CHLORIDE: 600; 310; 30; 20 INJECTION, SOLUTION INTRAVENOUS at 08:56

## 2023-11-30 RX ADMIN — FENTANYL CITRATE 50 MCG: 50 INJECTION, SOLUTION INTRAMUSCULAR; INTRAVENOUS at 09:45

## 2023-11-30 RX ADMIN — AMPICILLIN SODIUM 2000 MG: 2 INJECTION, POWDER, FOR SOLUTION INTRAVENOUS at 09:54

## 2023-11-30 RX ADMIN — OXYTOCIN 1 MILLI-UNITS/MIN: 10 INJECTION, SOLUTION INTRAMUSCULAR; INTRAVENOUS at 08:56

## 2023-11-30 ASSESSMENT — EDINBURGH POSTNATAL DEPRESSION SCALE (EPDS)
I HAVE BEEN ABLE TO LAUGH AND SEE THE FUNNY SIDE OF THINGS: AS MUCH AS I ALWAYS COULD
I HAVE BEEN ANXIOUS OR WORRIED FOR NO GOOD REASON: NO, NOT AT ALL
THE THOUGHT OF HARMING MYSELF HAS OCCURRED TO ME: NEVER
I HAVE BEEN SO UNHAPPY THAT I HAVE BEEN CRYING: ONLY OCCASIONALLY
I HAVE FELT SCARED OR PANICKY FOR NO GOOD REASON: NO, NOT AT ALL
I HAVE FELT SAD OR MISERABLE: NO, NOT AT ALL
I HAVE BEEN SO UNHAPPY THAT I HAVE HAD DIFFICULTY SLEEPING: NOT VERY OFTEN
I HAVE LOOKED FORWARD WITH ENJOYMENT TO THINGS: AS MUCH AS I EVER DID
THINGS HAVE BEEN GETTING ON TOP OF ME: NO, I HAVE BEEN COPING AS WELL AS EVER
I HAVE BLAMED MYSELF UNNECESSARILY WHEN THINGS WENT WRONG: NOT VERY OFTEN

## 2023-11-30 ASSESSMENT — PATIENT HEALTH QUESTIONNAIRE - PHQ9
SUM OF ALL RESPONSES TO PHQ9 QUESTIONS 1 AND 2: 0
1. LITTLE INTEREST OR PLEASURE IN DOING THINGS: NOT AT ALL
2. FEELING DOWN, DEPRESSED, IRRITABLE, OR HOPELESS: NOT AT ALL

## 2023-11-30 ASSESSMENT — ENCOUNTER SYMPTOMS
GASTROINTESTINAL NEGATIVE: 1
MUSCULOSKELETAL NEGATIVE: 1
CARDIOVASCULAR NEGATIVE: 1
CONSTITUTIONAL NEGATIVE: 1
NEUROLOGICAL NEGATIVE: 1
EYES NEGATIVE: 1
RESPIRATORY NEGATIVE: 1

## 2023-11-30 ASSESSMENT — PAIN DESCRIPTION - PAIN TYPE
TYPE: ACUTE PAIN

## 2023-11-30 ASSESSMENT — LIFESTYLE VARIABLES
EVER_SMOKED: NEVER
ALCOHOL_USE: NO

## 2023-11-30 NOTE — CARE PLAN
The patient is Stable - Low risk of patient condition declining or worsening    Shift Goals  Clinical Goals: Maintain stabl vital sign, lochia, and fundus WNL  Patient Goals: pain management  Family Goals: support      Problem: Knowledge Deficit - Postpartum  Goal: Patient will verbalize and demonstrate understanding of self and infant care  Outcome: Progressing  Note: Patient demonstrated proper care of self and infant throughout Shift.     Problem: Respiratory/Oxygenation Function Post-Surgical  Goal: Patient will achieve/maintain normal respiratory rate/effort  Outcome: Progressing  Note: Patient showed no signs or symptoms of respiratory distress throughout shift.

## 2023-11-30 NOTE — PROGRESS NOTES
1305- Received report from L&D RN. Orientated patient to room, call light and emergency light education provided. Assessment completed, fundus, lochia WNL.Plan of care reviewed, patient verbalized understanding. Denies pain at this time, will call if pain medication intervention is needed.

## 2023-11-30 NOTE — L&D DELIVERY NOTE
DELIVERY NOTE - Normal Spontaneous Vaginal Delivery    Pt progressed to complete, pushed, and delivered a viable male infant delivered in vertex presentation over intact perineum with apgars of 8 and 8, weight of unknown grams with no anesthesia. Infant placed on maternal abdomen.      Delayed cord clamping performed. Placenta delivered spontaneously, was 3 vessel and intact.  Cervix and perineum inspected.  IV Pitocin administered.  IV TXA 1000 mg administered for increased risk for postpartum hemorrhage.    No tears and no lacerations.    Hemostasis obtained.   cc.       There were no complications.  Mother and infant in stable condition in room.

## 2023-11-30 NOTE — PROGRESS NOTES
0700 - Report received from Mitali MIN, POC discussed, care assumed.    0850 - Verbal update given to Dr. Jackson, orders received to start pitocin 1x1.     1005 - This RN called to bedside for pt reporting SROM, confirmed moderate amount of clear, odorless fluid.     1018 - This RN called Dr. Jackson and transition RN to bedside for delivery.    1023 - Spontaneous vaginal delivery of male infant, APGARS 8/8, NICU and RT at bedside on standby for assistance.     1027 - Spontaneous delivery of intact placenta, discarded per MD orders.     1305 - Report given to Louis MIN, bands confirmed x2, care transferred.

## 2023-11-30 NOTE — PROGRESS NOTES
28 y.o.  EDC  by LMP (38.2 wks), GBS unknown    Pt presents to L&D c/o painful contractions starting earlier his morning that has since gotten worse. Pt has gotten no PNC during this pregnancy and states her EMMA is based on her LMP. Reports good FM. Denies VB/LOF. Denies any VB or any other abnormalities throughout her pregnancies. Denies any HA, vision changes or epigastric pain. Denies EFM/TOCO applied, VSS. SSE performed, no bleeding noted. SVE /-2.    0641: Rosa PACHECOM and Jeanne WU notified. Will come to bedside.     0642: Providers at bedside checking patient. Admission orders received.     Report given to Mitali MIN.

## 2023-11-30 NOTE — PROGRESS NOTES
"INTRAPARTUM EVALUATION    Subjective:  Pain is controlled without epidural.    Objective:  /70   Pulse 76   Temp 36.4 °C (97.5 °F) (Temporal)   Resp 18   Ht 1.6 m (5' 2.99\")   Wt 111 kg (245 lb)   SpO2 94%   BMI 43.41 kg/m²   Cervix: 8cm/80/-2/I by last exam.  FHR tracin bpm, moderate variability, accelerations noted, no decelerations  [place]: Contractions every 4 to 7 minutes of mild to moderate strength are noted on the external toco    Assessment and Plan:  Aleja Sanches is a  at 38w2d  1. Here for spontaneous active labor  - Pitocin is started for an adequate labor pattern and failure to progress for 2 hours  2.   Patient Active Problem List   Diagnosis    Labor and delivery indication for care or intervention    Group beta Strep positive    Normal labor       3. FHT Cat 1  4. GBS unknown this pregnancy, positive , plan begin treatment for history GBS +    Valery Jackson M.D.    "

## 2023-11-30 NOTE — H&P
"\Obstetrics & Gynecology History & Physical Note    Date of Service  2023    Chief Complaint  Contraction    History of Presenting Illness  Aleja Sanches is a 28 y.o.  at 38w2d 2023, by Patient Reported. She has not received prenatal care this pregnancy. She reports a history of uncomplicated pregnancies and births with \"big babies\".  She is being admitted for labor.    No prenatal care this pergnancy    Patient Active Problem List    Diagnosis Date Noted    Normal labor 2022    Group beta Strep positive 2022    Labor and delivery indication for care or intervention 2016       Obstetric History  OB History    Para Term  AB Living   7 5 5 0 1 5   SAB IAB Ectopic Molar Multiple Live Births   1       0 5      # Outcome Date GA Lbr [place]/2nd Weight Sex Delivery Anes PTL Lv   7 Current            6 Term 22 40w4d 09:10 / 00:30 4.35 kg (9 lb 9.4 oz) F Vag-Spont None N TIMOTHY   5 Term 20 41w0d / 00:06 3.565 kg (7 lb 13.8 oz) F Vag-Spont None N TIMOTHY   4 Term  40w0d  4.132 kg (9 lb 1.8 oz) F Vag-Spont None     3 Term  40w0d  4.082 kg (9 lb) M Vag-Spont   TIMOTHY   2 Term  40w0d  3.799 kg (8 lb 6 oz) M Vag-Spont   TIMOTHY   1 SAB                Gynecologic History  Denies pertinent gyn history    Review of Systems  Review of Systems   Constitutional: Negative.    HENT: Negative.     Eyes: Negative.    Respiratory: Negative.     Cardiovascular: Negative.    Gastrointestinal: Negative.    Musculoskeletal: Negative.    Neurological: Negative.        Medical History  Denies pertinent medical history    Surgical History  No pertinent past surgical history.       Family History  family history includes Diabetes in her father and mother; Hypertension in her father.     Social History   reports that she has never smoked. She has never used smokeless tobacco. She reports that she does not currently use alcohol. She reports that she does not use " "drugs.    Allergies  No Known Allergies    Medications  Prior to Admission Medications   Prescriptions Last Dose Informant Patient Reported? Taking?   Prenatal Vit w/Do-Hkrdcnrkv-LN (PNV PO)   Yes No   Sig: Take 1 Tab by mouth.   ibuprofen (MOTRIN) 800 MG Tab   No No   Sig: Take 1 Tablet by mouth every 8 hours as needed for Moderate Pain.   norethindrone (MICRONOR) 0.35 MG tablet   No No   Sig: Take 1 Tablet by mouth every day.      Facility-Administered Medications: None       Physical Exam  Vitals:    11/30/23 0633 11/30/23 0700   BP: 135/62 125/70   Pulse: 78 76   Resp: 18 18   Temp: 36.4 °C (97.6 °F) 36.4 °C (97.5 °F)   TempSrc: Temporal Temporal   SpO2: 95% 94%   Weight: 111 kg (245 lb) 111 kg (245 lb)   Height: 1.6 m (5' 3\") 1.6 m (5' 2.99\")       General:   alert, cooperative, no distress   Skin:   normal   HEENT:  extraocular movements intact   Lungs:   clear to auscultation bilaterally   Heart:   regular rate and rhythm   Breasts:   deferred   Abdomen:  Abdomen soft, non-tender; gravid.   Pelvis: no visible external lesions   FHT:  120 BPM Fetal heart variability: moderate, minimal   [place]: External US   Presentations:  vertex by SVE   Cervix:     Dilation:  8    Effacement:  80    Station:   -1    Consistency:  soft    Position:  anterior       Laboratory:  Prenatal Results       General (Most Recent Result)       Test Value Reference Range Date Time    ABO  O   07/29/22 0352    Rh  POS   07/29/22 0352    Antibody screen  NEG   07/29/22 0352    HbA1c        Chlamydia by PCR  Negative  Negative 05/06/20 1505    Gonorrhea by PCR  Negative  Negative 05/06/20 1505    RPR/Syphilus  Non-Reactive  Non-Reactive 07/29/22 0352    HSV 1/2 by PCR (non-serum)        HSV 1/2 (serum)        HSV 1        HSV 2        HPV (16)        HBsAg  Non-Reactive  Non-Reactive 07/29/22 0352    HIV-1 HIV-2 Antibodies  Non-Reactive  Non Reactive 07/29/22 0352    Rubella  203.00 IU/mL  07/29/22 0352    Tb                  Pap Smear " (Most Recent Result)       Test Value Reference Range Date Time    Pap smear        Pap smear w/HPV        Pap smear w/CTNG        Pap smar w/HPV CTNG        Pap smear (reflex HPV ACUS)        Pap smear (reflex HPV ASCUS w/CTNG)  (See Report)    05/06/20 1505    Pathology gyn specimen  (See Report)    05/06/20 1505              Urinalysis (Most Recent Result)       Test Value Reference Range Date Time    Urinalysis  Abnormal   (See Report)    01/07/16 2120    POC urinalysis  (See Report)    05/06/20 1441    Urine drug screen (w/ conf)        Urine culture ([id number])        Urine Protein/Creatinine Ratio                  Urinalysis, Culture if indicated       Test Value Reference Range Date Time    Color  Yellow   05/06/20 1613    Appearance  Clear   05/06/20 1613    Specific Gravity  1.025  <1.035 05/06/20 1613    PH  6.0  5.0 - 8.0 05/06/20 1613    Glucose  Negative mg/dL Negative 05/06/20 1613    Ketones  Negative mg/dL Negative 05/06/20 1613    Protein  Negative mg/dL Negative 05/06/20 1613    Bilirubin  Negative  Negative 05/06/20 1613    Nitrites  Negative  Negative 05/06/20 1613    Leukocytes Esterase  Negative  Negative 05/06/20 1613    Blood  Trace  Negative 05/06/20 1613    Comment  Microscopic   05/06/20 1613    Culture                  Urine Drug Screen       Test Value Reference Range Date Time    Amphetamines  Negative  Negative 01/07/16 2120    Barbiturates  Negative  Negative 01/07/16 2120    Benzodiazepines  Negative  Negative 01/07/16 2120    Cocaine  Negative  Negative 01/07/16 2120    Methadone  Negative  Negative 01/07/16 2120    Opiates  Negative  Negative 01/07/16 2120    Oxycodone  Negative  Negative 01/07/16 2120    Phencylidine  Negative  Negative 01/07/16 2120    Propoxyphene  Negative  Negative 01/07/16 2120    Marijuana Metabolite  Negative  Negative 01/07/16 2120              1st Trimester       Test Value Reference Range Date Time    Hgb        Hct        Fasting Glucose Tolerance         GTT, 1 hour        GTT, 2 hours        GTT, 3 hours                  2nd Trimester       Test Value Reference Range Date Time    Hgb        Hct        AST        ALT        Uric Acid        Fasting Glucose Tolerance        GTT, 1 hour        GTT, 2 hours        GTT, 3 hours                  3rd Trimester       Test Value Reference Range Date Time    Hgb        Hct        Platelet count        GBS (JARA BROTH)        Fasting Glucose Tolerance        GTT, 1 hour        GTT, 2 hous        GTT, 3hours                  Congenital Disease Screening       Test Value Reference Range Date Time    First Trimester Screen        Quad Screen        BH Electrophoresis        Cystic Fibrosis Carrier Study        SMA        AFP Maternal Serum         AFP Tetra        NIPT                  Legend    ^: Historical                              Urinalysis:    No results found     Imaging:  None available; no US this pregnancy      Assessment:  28 y.o.  38w2d who presents with  Pregnancy [Z34.90]  Labor and delivery indication for care or intervention [O75.9]    Plan:  No new Assessment & Plan notes have been filed under this hospital service since the last note was generated.  Service: Obstetrics & Gynecology    1. Admit to L&D  2. Anticipate   3. FWB: \no distress  4. GBS: unk  5. Pain Control: none    VTE prophylaxis: none, low-risk    Rosa Peterson C.N.M.

## 2023-12-01 ENCOUNTER — PHARMACY VISIT (OUTPATIENT)
Dept: PHARMACY | Facility: MEDICAL CENTER | Age: 28
End: 2023-12-01
Payer: COMMERCIAL

## 2023-12-01 LAB
ERYTHROCYTE [DISTWIDTH] IN BLOOD BY AUTOMATED COUNT: 42.5 FL (ref 35.9–50)
HCT VFR BLD AUTO: 35.3 % (ref 37–47)
HGB BLD-MCNC: 11.1 G/DL (ref 12–16)
MCH RBC QN AUTO: 25.3 PG (ref 27–33)
MCHC RBC AUTO-ENTMCNC: 31.4 G/DL (ref 32.2–35.5)
MCV RBC AUTO: 80.6 FL (ref 81.4–97.8)
PLATELET # BLD AUTO: 224 K/UL (ref 164–446)
PMV BLD AUTO: 10.5 FL (ref 9–12.9)
RBC # BLD AUTO: 4.38 M/UL (ref 4.2–5.4)
WBC # BLD AUTO: 10.2 K/UL (ref 4.8–10.8)

## 2023-12-01 PROCEDURE — RXMED WILLOW AMBULATORY MEDICATION CHARGE: Performed by: OBSTETRICS & GYNECOLOGY

## 2023-12-01 PROCEDURE — 770002 HCHG ROOM/CARE - OB PRIVATE (112)

## 2023-12-01 PROCEDURE — 36415 COLL VENOUS BLD VENIPUNCTURE: CPT

## 2023-12-01 PROCEDURE — 85027 COMPLETE CBC AUTOMATED: CPT

## 2023-12-01 RX ORDER — ACETAMINOPHEN AND CODEINE PHOSPHATE 120; 12 MG/5ML; MG/5ML
1 SOLUTION ORAL DAILY
Qty: 28 TABLET | Refills: 12 | Status: SHIPPED | OUTPATIENT
Start: 2023-12-01

## 2023-12-01 ASSESSMENT — PAIN DESCRIPTION - PAIN TYPE
TYPE: ACUTE PAIN

## 2023-12-01 NOTE — PROGRESS NOTES
"Post Partum Vaginal Delivery Note    Subjective: 28-year-old  7 para 5-0-1-5 who had a vaginal delivery yesterday at 38 weeks and 3 days estimated gestational age.    Doing well without significant complaints.  Normal lochia.    Vitals: /57   Pulse 78   Temp 36.7 °C (98 °F) (Oral)   Resp 18   Ht 1.6 m (5' 2.99\")   Wt 111 kg (245 lb)   SpO2 95%   Breastfeeding Yes   BMI 43.41 kg/m²   Temp (24hrs), Av.5 °C (97.7 °F), Min:36.4 °C (97.5 °F), Max:36.7 °C (98 °F)      Exam:      GEN: Patient without distress.    Abdomen: soft, non-tender; fundus firm at/below umbilicus    Extremitie: Trace edema; calves non-tender    Delivery Blood Loss: 100  ml    Labs:  Recent Labs     23  0725   WBC 10.0   RBC 4.56   HEMOGLOBIN 11.9*   HEMATOCRIT 36.0*   MCV 78.9*   MCH 26.1*   RDW 41.5   PLATELETCT 228   MPV 10.8   NEUTSPOLYS 78.40*   LYMPHOCYTES 13.40*   MONOCYTES 7.20   EOSINOPHILS 0.30   BASOPHILS 0.20         Impression:   28 y.o.      Postpartum vaginal delivery    - routine postpartum care and maternal education    - Discharge patient later today, instructions given.          Signed By:   Valery Jackson M.D.            "

## 2023-12-01 NOTE — LACTATION NOTE
This note was copied from a baby's chart.  Initial Consult:     History:  RADHA is 27 y/o,  s/p  at 38+3 weeks on  at 1023. PMH significant for no prenatal care this pregnancy. Does not have health insurance at this time, but will be on partner's insurance after they are . Does not qualify for WIC, MOB has already investigated.     History of BF:  MOB  all prior babies.      Report of Current Breastfeeding Status: RADHA reported she is comfortable with positioning and latch. States this baby  immediately after delivery. States she  all her other babies without difficulty, but this baby has had the easiest and most comfortable latch. She is happy breastfeeding is going so well. She denies discomfort with latch.     She denies additional questions or concerns at this time, and declines assistance/assessment with latch.     Provided NN Breastfeeding Resources.     Breastfeeding plan:    Feed baby with feeding cues and at least a minimum of 8x/24 hours.  Expect cluster feeding as this is normal during early days of life and growth spurts.  It is not recommended to let baby sleep longer than 4 hours between feedings and if sleepy, place skin to skin to promote feeding interest and milk production.  Baby's usually feed more frequently and longer when skin to skin with mother. It is not recommended to use pacifiers or supplementing with formula until breast feeding and milk production is well established or at least 4 weeks.    Make sure infant is getting enough by ensuring frequent feedings as well as looking for transitioning stools from dark meconium to bright yellow/green seedy loose stools by day 5.     Follow up with PEDS PCP as scheduled for weight checks and to make sure feeding is progressing appropriately.    Call for breastfeeding for 1:1 lactation consultation through  [facility] (see information sheet) as needed and attend the BF [facility] without need for  appointment.

## 2023-12-01 NOTE — DISCHARGE PLANNING
Discharge Planning Assessment Post Partum    Reason for Referral: No prenatal care   Address: [address] [place] [address] [place] [address]  Phone: [phone]  Type of Living Situation: living with FOB and children  Mom Diagnosis: Pregnancy, vaginal delivery  Baby Diagnosis: [place]-38.2 weeks  Primary Language: English    Name of Baby: Ryley Romano (: 23)  Father of the Baby: Ryley Romano  Involved in baby’s care? Yes  Contact Information: [phone]    Prenatal Care: No.  MOB explained that they do not have insurance and are over-come for Medicaid due to FOB working at [employer].  MOB stated she and FOB plan to get  this summer and they will be able to go onto FOB's insurance.  Mom's PCP: No PCP listed  PCP for new baby: POLLY Ann     Support System: FOB  Coping/Bonding between mother & baby: Yes  Source of Feeding: breast feeding   Supplies for Infant: prepared for infant; denies any needs    Mom's Insurance: None currently.  Plan to get onto FOB's insurance once they get .  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  Other children in the home/names & ages: five children; ages 8, 7, 6, 3, and 15 months     Financial Hardship/Income: No, FOB is employed at [employer]   Mom's Mental status: alert and oriented  Services used prior to admit: None     CPS History: No  Psychiatric History: No, MOB scored a 3 on the EPDS screen   Domestic Violence History: No  Drug/ETOH History: No, baby's UDS is negative     Resources Provided: diaper bank assistance resources provided   Referrals Made: diaper bank referral provided      Clearance for Discharge: Infant is cleared to discharge home with parents once medically cleared

## 2023-12-01 NOTE — PROGRESS NOTES
0715- Shift report  received from RN. Patient in stable condition.    0915- assessment completed. Plan of care reviewed, patient verbalized  understanding. Patient indicated will call if pain medication intervention is needed.

## 2023-12-01 NOTE — DISCHARGE PLANNING
Meds-to-Beds: Discharge prescription orders listed below delivered to patient's bedside. RN notified. Patient counseled. Patient elected to have co-payment billed to patient account.       Current Outpatient Medications   Medication Sig Dispense Refill    norethindrone (MICRONOR) 0.35 MG tablet Take 1 Tablet by mouth every day. 28 Tablet 12      Elizabeth Real, PharmD

## 2023-12-01 NOTE — CARE PLAN
Problem: Altered Physiologic Condition  Goal: Patient physiologically stable as evidenced by normal lochia, palpable uterine involution and vitals within normal limits  Outcome: Progressing   The patient is Stable - Low risk of patient condition declining or worsening    Shift Goals  Clinical Goals: vitals stable, ambulating and voiding without difficulty, fundus firm,   Patient Goals: infant cares, rest  Family Goals: support    Progress made toward(s) clinical / shift goals:  Aleja is post partum with her sixth baby, her fundus is firm, she is ambulating and voiding without difficulty, she is doing all infant cares independently and has her SO at bedside for support    Patient is not progressing towards the following goals:

## 2023-12-01 NOTE — PROGRESS NOTES
Shift Goals  Clinical Goals: vitals stable, ambulating and voiding without difficulty, fundus firm,   Patient Goals: infant cares, rest  Family Goals: support    Progress made toward(s) clinical / shift goals:  Aleja is post partum with her sixth baby, her fundus is firm, she is ambulating and voiding without difficulty, she is doing all infant cares independently and has her SO at bedside for support

## 2023-12-02 VITALS
SYSTOLIC BLOOD PRESSURE: 115 MMHG | RESPIRATION RATE: 17 BRPM | OXYGEN SATURATION: 95 % | WEIGHT: 245 LBS | BODY MASS INDEX: 43.41 KG/M2 | TEMPERATURE: 97.5 F | DIASTOLIC BLOOD PRESSURE: 61 MMHG | HEIGHT: 63 IN | HEART RATE: 75 BPM

## 2023-12-02 PROCEDURE — 700111 HCHG RX REV CODE 636 W/ 250 OVERRIDE (IP): Performed by: OBSTETRICS & GYNECOLOGY

## 2023-12-02 PROCEDURE — 90471 IMMUNIZATION ADMIN: CPT

## 2023-12-02 PROCEDURE — 90686 IIV4 VACC NO PRSV 0.5 ML IM: CPT | Performed by: OBSTETRICS & GYNECOLOGY

## 2023-12-02 RX ADMIN — INFLUENZA A VIRUS A/VICTORIA/4897/2022 IVR-238 (H1N1) ANTIGEN (FORMALDEHYDE INACTIVATED), INFLUENZA A VIRUS A/DARWIN/9/2021 SAN-010 (H3N2) ANTIGEN (FORMALDEHYDE INACTIVATED), INFLUENZA B VIRUS B/PHUKET/3073/2013 ANTIGEN (FORMALDEHYDE INACTIVATED), AND INFLUENZA B VIRUS B/MICHIGAN/01/2021 ANTIGEN (FORMALDEHYDE INACTIVATED) 0.5 ML: 15; 15; 15; 15 INJECTION, SUSPENSION INTRAMUSCULAR at 11:56

## 2023-12-02 ASSESSMENT — PATIENT HEALTH QUESTIONNAIRE - PHQ9
2. FEELING DOWN, DEPRESSED, IRRITABLE, OR HOPELESS: NOT AT ALL
SUM OF ALL RESPONSES TO PHQ9 QUESTIONS 1 AND 2: 0
1. LITTLE INTEREST OR PLEASURE IN DOING THINGS: NOT AT ALL

## 2023-12-02 NOTE — DISCHARGE SUMMARY
Discharge Summary:     Date of Admission: 2023  Date of Discharge: 23      Admitting diagnosis:    1. Pregnancy @ 38w4d  2.  No prenatal care  3.  Group B strep positive      Discharge Diagnosis:   1. Status post vaginal, spontaneous.  2.  GBS unknown positive    History reviewed. No pertinent past medical history.  OB History    Para Term  AB Living   7 5 5 0 1 5   SAB IAB Ectopic Molar Multiple Live Births   1       0 5      # Outcome Date GA Lbr [place]/2nd Weight Sex Delivery Anes PTL Lv   7 Current            6 Term 22 40w4d 09:10 / 00:30 4.35 kg (9 lb 9.4 oz) F Vag-Spont None N TIMOTHY   5 Term 20 41w0d / 00:06 3.565 kg (7 lb 13.8 oz) F Vag-Spont None N TIMOTHY   4 Term  40w0d  4.132 kg (9 lb 1.8 oz) F Vag-Spont None     3 Term  40w0d  4.082 kg (9 lb) M Vag-Spont   TIMOTHY   2 Term  40w0d  3.799 kg (8 lb 6 oz) M Vag-Spont   TIMOTHY   1 SAB              History reviewed. No pertinent surgical history.  Patient has no known allergies.    Patient Active Problem List   Diagnosis    Labor and delivery indication for care or intervention    Group beta Strep positive    Normal labor       Hospital Course:   Pt is a 28 y.o. now  who presented for pregnancy.  Pitocin was started for an adequate labor pattern failure to progress for 2 hours.  Later that day on  she delivered a viable male infant.  IV TXA was started and EBL was 100 cc.  The following day the mother felt well was meeting all milestones.  Discharge orders were placed but  stayed 1 extra night for continued monitoring due to unknown GBS and 1 elevated temperature.  The day after that on  the patient still felt well.  She was encountered ambulating around room without issue.  She has already had bowel movements and voided.  Her pain is controlled, and vaginal bleeding was minimal to none per her report.  Return precautions were discussed which patient understood and she will be discharged in fair  condition with baby.      Physical Exam:  Temp:  [36.4 °C (97.5 °F)] 36.4 °C (97.5 °F)  Pulse:  [75-81] 75  Resp:  [17] 17  BP: (115-149)/(61-80) 115/61  SpO2:  [95 %] 95 %  Physical Exam  General: well  Abdomen: soft, non-distended  Fundus: firm and below umbilicus  Incision: not applicable, (vaginal delivery)  Perineum: deferred  Extremities: symmetric, calves nontender    Current Facility-Administered Medications   Medication Dose    LR infusion      oxytocin (Pitocin) infusion (for post delivery)  125 mL/hr    oxytocin (Pitocin) 0.02 Units/mL LR (induction of labor)  0.5-20 bobo-units/min    lactated ringers infusion      docusate sodium (Colace) capsule 100 mg  100 mg    ibuprofen (Motrin) tablet 800 mg  800 mg    acetaminophen (Tylenol) tablet 1,000 mg  1,000 mg    PRN oxytocin (PITOCIN) (20 Units/1000 mL) PRN for excessive uterine bleeding - See Admin Instr  125-999 mL/hr    miSOPROStol (Cytotec) tablet 600 mcg  600 mcg    oxyCODONE immediate-release (Roxicodone) tablet 5 mg  5 mg       Recent Labs     23  0725 23  0519   WBC 10.0 10.2   RBC 4.56 4.38   HEMOGLOBIN 11.9* 11.1*   HEMATOCRIT 36.0* 35.3*   MCV 78.9* 80.6*   MCH 26.1* 25.3*   MCHC 33.1 31.4*   RDW 41.5 42.5   PLATELETCT 228 224   MPV 10.8 10.5         Activity/ Discharge Instructions::   Discharge to home  Pelvic Rest x 6 weeks  No heavy lifting x4 weeks  Call or come to ED for: heavy vaginal bleeding, fever >100.4, severe abdominal pain, severe headache, chest pain, shortness of breath,  N/V, incisional drainage, or other concerns.       Follow up:  [facility] in 5 weeks for vaginal delivery; 1 week for incision check for  delivery.     Discharge Meds:   Current Outpatient Medications   Medication Sig Dispense Refill    norethindrone (MICRONOR) 0.35 MG tablet Take 1 Tablet by mouth every day. 28 Tablet 12       Aaron Shanks M.D.  PGY-1  [facility]

## 2023-12-02 NOTE — PROGRESS NOTES
Discharge order received. No PIV.  Printed discharge instructions and prescriptions given to pt. All discharge education complete, specifically need need to f/u with OB and come back through the ED for new or worsening symptoms, all questions and concerns were addressed. Pt walked off unit per preference

## 2023-12-02 NOTE — LACTATION NOTE
This note was copied from a baby's chart.  Follow up:    MOB reports breastfeeding is going well, baby feeding at least every 2-3hrs, latch feels comfortable, denies pain or pinching and able to hear swallows.     Declines assistance with latch.    Plan:  Continue to offer infant the breast per feeding cues for a minimum of 8 or more feeds in a 24 hour period.  Frequent skin to skin as MOB awake and attentive.

## 2023-12-02 NOTE — DISCHARGE INSTRUCTIONS

## 2023-12-02 NOTE — CARE PLAN
The patient is Stable - Low risk of patient condition declining or worsening    Shift Goals  Clinical Goals: Maintain stable vital signs  Patient Goals: infant cares, rest  Family Goals: support      Problem: Respiratory/Oxygenation Function Post-Surgical  Goal: Patient will achieve/maintain normal respiratory rate/effort  Outcome: Progressing  Note: Patient showed no signs or symptoms of  respiratory distress throughout shift.     Problem: Psychosocial - Postpartum  Goal: Patient will verbalize and demonstrate effective bonding and parenting behavior  Outcome: Progressing  Note: Patient  demonstrated proper care of infant and self throughout shift

## 2023-12-20 ASSESSMENT — ENCOUNTER SYMPTOMS
NEUROLOGICAL NEGATIVE: 1
RESPIRATORY NEGATIVE: 1
CARDIOVASCULAR NEGATIVE: 1
MUSCULOSKELETAL NEGATIVE: 1
GASTROINTESTINAL NEGATIVE: 1
CONSTITUTIONAL NEGATIVE: 1

## 2023-12-20 NOTE — ED PROVIDER NOTES
Emergency Obstetric Consultation     Date of Service  2023    Reason for Consultation  Contractions      History of Presenting Illness  28 y.o. female who presented 2023 with contractions starting this morning that have increased in intensity and frequency. She reports good FM, denies LOF or VB. She has not had prenatal care during this pregnancy.     She has had five previous uncomplicated vaginal deliveries.     Review of Systems  Review of Systems   Constitutional: Negative.    Respiratory: Negative.     Cardiovascular: Negative.    Gastrointestinal: Negative.    Musculoskeletal: Negative.    Neurological: Negative.        Obstetric History    OB History    Para Term  AB Living   7 5 5 0 1 5   SAB IAB Ectopic Molar Multiple Live Births   1       0 5      # Outcome Date GA Lbr [place]/2nd Weight Sex Delivery Anes PTL Lv   7 Current            6 Term 22 40w4d 09:10 / 00:30 4.35 kg (9 lb 9.4 oz) F Vag-Spont None N TIMOTHY   5 Term 20 41w0d / 00:06 3.565 kg (7 lb 13.8 oz) F Vag-Spont None N TIMOTHY   4 Term  40w0d  4.132 kg (9 lb 1.8 oz) F Vag-Spont None     3 Term  40w0d  4.082 kg (9 lb) M Vag-Spont   TIMOTHY   2 Term  40w0d  3.799 kg (8 lb 6 oz) M Vag-Spont   TIMOTHY   1 SAB                Gynecologic History  No LMP recorded. Patient is pregnant.    Medical History  History reviewed. No pertinent past medical history.    Surgical History   has no past surgical history on file.    Family History  family history includes Diabetes in her father and mother; Hypertension in her father.    Social History   reports that she has never smoked. She has never used smokeless tobacco. She reports that she does not currently use alcohol. She reports that she does not use drugs.    Medications  No medications prior to admission.       Allergies  No Known Allergies    Physical Exam  Maternal Exam:  Uterine Assessment: Contraction strength is firm.  Contraction frequency is regular.    Abdomen: Patient reports no abdominal tenderness. Fetal presentation: vertex  Variability: moderate (6-25 bpm).    Fetal State Assessment: Category I - tracings are normal.  Cardiovascular:      Rate and Rhythm: Normal rate and regular rhythm.   Musculoskeletal:         General: Normal range of motion.   Pulmonary:      Effort: Pulmonary effort is normal.   Neurological:      Mental Status: She is alert.         Assessment & Plan   at 41w1d in active labor  Admit to L&D    2.  No prenatal care this pregnancy  Collect admission labs    3. Category 1 FHR tracing  CEFM    4. Anticipate       Rosa Peterson C.N.M.